# Patient Record
Sex: FEMALE | Race: WHITE | NOT HISPANIC OR LATINO
[De-identification: names, ages, dates, MRNs, and addresses within clinical notes are randomized per-mention and may not be internally consistent; named-entity substitution may affect disease eponyms.]

---

## 2017-01-23 ENCOUNTER — APPOINTMENT (OUTPATIENT)
Dept: FAMILY MEDICINE | Facility: CLINIC | Age: 67
End: 2017-01-23
Payer: MEDICARE

## 2017-01-23 ENCOUNTER — FORM ENCOUNTER (OUTPATIENT)
Age: 67
End: 2017-01-23

## 2017-01-23 VITALS
SYSTOLIC BLOOD PRESSURE: 150 MMHG | BODY MASS INDEX: 21.33 KG/M2 | WEIGHT: 144 LBS | DIASTOLIC BLOOD PRESSURE: 86 MMHG | HEIGHT: 69 IN

## 2017-01-23 PROCEDURE — 99214 OFFICE O/P EST MOD 30 MIN: CPT | Mod: 25

## 2017-01-23 PROCEDURE — 36415 COLL VENOUS BLD VENIPUNCTURE: CPT

## 2017-01-24 ENCOUNTER — OUTPATIENT (OUTPATIENT)
Dept: OUTPATIENT SERVICES | Facility: HOSPITAL | Age: 67
LOS: 1 days | End: 2017-01-24
Payer: MEDICARE

## 2017-01-24 ENCOUNTER — TRANSCRIPTION ENCOUNTER (OUTPATIENT)
Age: 67
End: 2017-01-24

## 2017-01-24 ENCOUNTER — APPOINTMENT (OUTPATIENT)
Dept: ULTRASOUND IMAGING | Facility: CLINIC | Age: 67
End: 2017-01-24
Payer: MEDICARE

## 2017-01-24 DIAGNOSIS — R59.0 LOCALIZED ENLARGED LYMPH NODES: ICD-10-CM

## 2017-01-24 LAB
BASOPHILS # BLD AUTO: 0.06 K/UL
BASOPHILS NFR BLD AUTO: 1.1 %
EOSINOPHIL # BLD AUTO: 0.29 K/UL
EOSINOPHIL NFR BLD AUTO: 5.5 %
HCT VFR BLD CALC: 42.4 %
HGB BLD-MCNC: 13.6 G/DL
IMM GRANULOCYTES NFR BLD AUTO: 0.2 %
LYMPHOCYTES # BLD AUTO: 1.45 K/UL
LYMPHOCYTES NFR BLD AUTO: 27.4 %
MAN DIFF?: NORMAL
MCHC RBC-ENTMCNC: 31.7 PG
MCHC RBC-ENTMCNC: 32.1 GM/DL
MCV RBC AUTO: 98.8 FL
MONOCYTES # BLD AUTO: 0.3 K/UL
MONOCYTES NFR BLD AUTO: 5.7 %
NEUTROPHILS # BLD AUTO: 3.18 K/UL
NEUTROPHILS NFR BLD AUTO: 60.1 %
PLATELET # BLD AUTO: 230 K/UL
RBC # BLD: 4.29 M/UL
RBC # FLD: 12.5 %
WBC # FLD AUTO: 5.29 K/UL

## 2017-01-24 PROCEDURE — 76705 ECHO EXAM OF ABDOMEN: CPT

## 2017-01-24 PROCEDURE — 76705 ECHO EXAM OF ABDOMEN: CPT | Mod: 26

## 2017-01-30 ENCOUNTER — RESULT REVIEW (OUTPATIENT)
Age: 67
End: 2017-01-30

## 2017-01-30 ENCOUNTER — FORM ENCOUNTER (OUTPATIENT)
Age: 67
End: 2017-01-30

## 2017-01-31 ENCOUNTER — OUTPATIENT (OUTPATIENT)
Dept: OUTPATIENT SERVICES | Facility: HOSPITAL | Age: 67
LOS: 1 days | End: 2017-01-31
Payer: MEDICARE

## 2017-01-31 ENCOUNTER — APPOINTMENT (OUTPATIENT)
Dept: ULTRASOUND IMAGING | Facility: CLINIC | Age: 67
End: 2017-01-31

## 2017-01-31 DIAGNOSIS — R59.0 LOCALIZED ENLARGED LYMPH NODES: ICD-10-CM

## 2017-01-31 PROCEDURE — 88173 CYTOPATH EVAL FNA REPORT: CPT

## 2017-01-31 PROCEDURE — 88305 TISSUE EXAM BY PATHOLOGIST: CPT

## 2017-01-31 PROCEDURE — 76942 ECHO GUIDE FOR BIOPSY: CPT

## 2017-01-31 PROCEDURE — 38505 NEEDLE BIOPSY LYMPH NODES: CPT

## 2017-02-01 LAB — NON-GYNECOLOGICAL CYTOLOGY STUDY: SIGNIFICANT CHANGE UP

## 2017-02-02 ENCOUNTER — APPOINTMENT (OUTPATIENT)
Dept: ULTRASOUND IMAGING | Facility: CLINIC | Age: 67
End: 2017-02-02

## 2017-02-03 ENCOUNTER — OTHER (OUTPATIENT)
Age: 67
End: 2017-02-03

## 2017-02-07 ENCOUNTER — RESULT REVIEW (OUTPATIENT)
Age: 67
End: 2017-02-07

## 2017-02-07 ENCOUNTER — APPOINTMENT (OUTPATIENT)
Dept: RADIATION ONCOLOGY | Facility: CLINIC | Age: 67
End: 2017-02-07

## 2017-02-07 VITALS
RESPIRATION RATE: 16 BRPM | HEART RATE: 74 BPM | OXYGEN SATURATION: 100 % | SYSTOLIC BLOOD PRESSURE: 160 MMHG | DIASTOLIC BLOOD PRESSURE: 78 MMHG

## 2017-02-08 ENCOUNTER — OUTPATIENT (OUTPATIENT)
Dept: OUTPATIENT SERVICES | Facility: HOSPITAL | Age: 67
LOS: 1 days | Discharge: HOME | End: 2017-02-08

## 2017-02-08 ENCOUNTER — APPOINTMENT (OUTPATIENT)
Dept: HEMATOLOGY ONCOLOGY | Facility: CLINIC | Age: 67
End: 2017-02-08

## 2017-02-08 VITALS
TEMPERATURE: 97.9 F | RESPIRATION RATE: 16 BRPM | BODY MASS INDEX: 22.12 KG/M2 | SYSTOLIC BLOOD PRESSURE: 175 MMHG | DIASTOLIC BLOOD PRESSURE: 73 MMHG | WEIGHT: 149.8 LBS | OXYGEN SATURATION: 98 % | HEART RATE: 78 BPM

## 2017-02-08 DIAGNOSIS — C80.1 MALIGNANT (PRIMARY) NEOPLASM, UNSPECIFIED: ICD-10-CM

## 2017-02-08 LAB
BASOPHILS # BLD AUTO: 0.1 K/UL — SIGNIFICANT CHANGE UP (ref 0–0.2)
BASOPHILS NFR BLD AUTO: 1.4 % — SIGNIFICANT CHANGE UP (ref 0–2)
EOSINOPHIL # BLD AUTO: 0.2 K/UL — SIGNIFICANT CHANGE UP (ref 0–0.5)
EOSINOPHIL NFR BLD AUTO: 4 % — SIGNIFICANT CHANGE UP (ref 0–6)
HCT VFR BLD CALC: 43.9 % — SIGNIFICANT CHANGE UP (ref 34.5–45)
HGB BLD-MCNC: 14 G/DL — SIGNIFICANT CHANGE UP (ref 11.5–15.5)
LYMPHOCYTES # BLD AUTO: 1.3 K/UL — SIGNIFICANT CHANGE UP (ref 1–3.3)
LYMPHOCYTES # BLD AUTO: 26.1 % — SIGNIFICANT CHANGE UP (ref 13–44)
MCHC RBC-ENTMCNC: 31 PG — SIGNIFICANT CHANGE UP (ref 27–34)
MCHC RBC-ENTMCNC: 31.9 GM/DL — LOW (ref 32–36)
MCV RBC AUTO: 97.1 FL — SIGNIFICANT CHANGE UP (ref 80–100)
MONOCYTES # BLD AUTO: 0.3 K/UL — SIGNIFICANT CHANGE UP (ref 0–0.9)
MONOCYTES NFR BLD AUTO: 6.7 % — SIGNIFICANT CHANGE UP (ref 2–14)
NEUTROPHILS # BLD AUTO: 3 K/UL — SIGNIFICANT CHANGE UP (ref 1.8–7.4)
NEUTROPHILS NFR BLD AUTO: 61.8 % — SIGNIFICANT CHANGE UP (ref 43–77)
PLATELET # BLD AUTO: 215 K/UL — SIGNIFICANT CHANGE UP (ref 150–400)
RBC # BLD: 4.52 M/UL — SIGNIFICANT CHANGE UP (ref 3.8–5.2)
RBC # FLD: 11.2 % — SIGNIFICANT CHANGE UP (ref 10.3–14.5)
WBC # BLD: 4.9 K/UL — SIGNIFICANT CHANGE UP (ref 3.8–10.5)
WBC # FLD AUTO: 4.9 K/UL — SIGNIFICANT CHANGE UP (ref 3.8–10.5)

## 2017-02-12 LAB
ALBUMIN SERPL ELPH-MCNC: 4.5 G/DL
ALP BLD-CCNC: 79 U/L
ALT SERPL-CCNC: 27 U/L
ANION GAP SERPL CALC-SCNC: 12 MMOL/L
AST SERPL-CCNC: 28 U/L
BILIRUB SERPL-MCNC: 0.3 MG/DL
BUN SERPL-MCNC: 20 MG/DL
CALCIUM SERPL-MCNC: 9.8 MG/DL
CANCER AG19-9 SERPL-ACNC: 49.7 U/ML
CEA SERPL-MCNC: 4.1 NG/ML
CHLORIDE SERPL-SCNC: 99 MMOL/L
CO2 SERPL-SCNC: 29 MMOL/L
CREAT SERPL-MCNC: 0.86 MG/DL
GLUCOSE SERPL-MCNC: 91 MG/DL
MAGNESIUM SERPL-MCNC: 2.4 MG/DL
POTASSIUM SERPL-SCNC: 4.8 MMOL/L
PROT SERPL-MCNC: 7.2 G/DL
SODIUM SERPL-SCNC: 140 MMOL/L

## 2017-02-14 ENCOUNTER — FORM ENCOUNTER (OUTPATIENT)
Age: 67
End: 2017-02-14

## 2017-02-15 ENCOUNTER — APPOINTMENT (OUTPATIENT)
Dept: CT IMAGING | Facility: CLINIC | Age: 67
End: 2017-02-15

## 2017-02-15 ENCOUNTER — OUTPATIENT (OUTPATIENT)
Dept: OUTPATIENT SERVICES | Facility: HOSPITAL | Age: 67
LOS: 1 days | End: 2017-02-15
Payer: MEDICARE

## 2017-02-15 ENCOUNTER — APPOINTMENT (OUTPATIENT)
Dept: FAMILY MEDICINE | Facility: CLINIC | Age: 67
End: 2017-02-15

## 2017-02-15 DIAGNOSIS — Z00.8 ENCOUNTER FOR OTHER GENERAL EXAMINATION: ICD-10-CM

## 2017-02-15 DIAGNOSIS — C80.1 MALIGNANT (PRIMARY) NEOPLASM, UNSPECIFIED: ICD-10-CM

## 2017-02-15 PROCEDURE — 82565 ASSAY OF CREATININE: CPT

## 2017-02-15 PROCEDURE — 71260 CT THORAX DX C+: CPT

## 2017-02-15 PROCEDURE — 74177 CT ABD & PELVIS W/CONTRAST: CPT

## 2017-02-15 PROCEDURE — 70491 CT SOFT TISSUE NECK W/DYE: CPT

## 2017-02-15 PROCEDURE — 74177 CT ABD & PELVIS W/CONTRAST: CPT | Mod: 26

## 2017-02-15 PROCEDURE — 70491 CT SOFT TISSUE NECK W/DYE: CPT | Mod: 26

## 2017-02-15 PROCEDURE — 71260 CT THORAX DX C+: CPT | Mod: 26

## 2017-02-22 ENCOUNTER — RESULT REVIEW (OUTPATIENT)
Age: 67
End: 2017-02-22

## 2017-02-23 ENCOUNTER — OUTPATIENT (OUTPATIENT)
Dept: OUTPATIENT SERVICES | Facility: HOSPITAL | Age: 67
LOS: 1 days | End: 2017-02-23
Payer: MEDICARE

## 2017-02-23 ENCOUNTER — EMERGENCY (EMERGENCY)
Facility: HOSPITAL | Age: 67
LOS: 1 days | Discharge: HOME | End: 2017-02-23
Attending: EMERGENCY MEDICINE
Payer: MEDICARE

## 2017-02-23 VITALS
HEART RATE: 79 BPM | TEMPERATURE: 98 F | WEIGHT: 143.96 LBS | RESPIRATION RATE: 16 BRPM | DIASTOLIC BLOOD PRESSURE: 72 MMHG | OXYGEN SATURATION: 98 % | HEIGHT: 64 IN | SYSTOLIC BLOOD PRESSURE: 125 MMHG

## 2017-02-23 VITALS
TEMPERATURE: 98 F | RESPIRATION RATE: 18 BRPM | SYSTOLIC BLOOD PRESSURE: 110 MMHG | HEART RATE: 74 BPM | OXYGEN SATURATION: 98 % | DIASTOLIC BLOOD PRESSURE: 78 MMHG

## 2017-02-23 DIAGNOSIS — E86.0 DEHYDRATION: ICD-10-CM

## 2017-02-23 DIAGNOSIS — R00.0 TACHYCARDIA, UNSPECIFIED: ICD-10-CM

## 2017-02-23 DIAGNOSIS — K62.5 HEMORRHAGE OF ANUS AND RECTUM: ICD-10-CM

## 2017-02-23 DIAGNOSIS — R00.2 PALPITATIONS: ICD-10-CM

## 2017-02-23 LAB
ALBUMIN SERPL ELPH-MCNC: 3.9 G/DL — SIGNIFICANT CHANGE UP (ref 3.3–5.2)
ALP SERPL-CCNC: 65 U/L — SIGNIFICANT CHANGE UP (ref 40–120)
ALT FLD-CCNC: 15 U/L — SIGNIFICANT CHANGE UP
ANION GAP SERPL CALC-SCNC: 12 MMOL/L — SIGNIFICANT CHANGE UP (ref 5–17)
APTT BLD: 31.7 SEC — SIGNIFICANT CHANGE UP (ref 27.5–37.4)
AST SERPL-CCNC: 18 U/L — SIGNIFICANT CHANGE UP
BASOPHILS # BLD AUTO: 0 K/UL — SIGNIFICANT CHANGE UP (ref 0–0.2)
BASOPHILS NFR BLD AUTO: 0.3 % — SIGNIFICANT CHANGE UP (ref 0–2)
BILIRUB SERPL-MCNC: 0.3 MG/DL — LOW (ref 0.4–2)
BUN SERPL-MCNC: 12 MG/DL — SIGNIFICANT CHANGE UP (ref 8–20)
CALCIUM SERPL-MCNC: 8.9 MG/DL — SIGNIFICANT CHANGE UP (ref 8.6–10.2)
CHLORIDE SERPL-SCNC: 104 MMOL/L — SIGNIFICANT CHANGE UP (ref 98–107)
CO2 SERPL-SCNC: 25 MMOL/L — SIGNIFICANT CHANGE UP (ref 22–29)
CREAT SERPL-MCNC: 0.73 MG/DL — SIGNIFICANT CHANGE UP (ref 0.5–1.3)
EOSINOPHIL # BLD AUTO: 0.1 K/UL — SIGNIFICANT CHANGE UP (ref 0–0.5)
EOSINOPHIL NFR BLD AUTO: 1.3 % — SIGNIFICANT CHANGE UP (ref 0–6)
GLUCOSE SERPL-MCNC: 104 MG/DL — SIGNIFICANT CHANGE UP (ref 70–115)
HCT VFR BLD CALC: 40.6 % — SIGNIFICANT CHANGE UP (ref 37–47)
HGB BLD-MCNC: 13.5 G/DL — SIGNIFICANT CHANGE UP (ref 12–16)
INR BLD: 1.01 RATIO — SIGNIFICANT CHANGE UP (ref 0.88–1.16)
LYMPHOCYTES # BLD AUTO: 0.9 K/UL — LOW (ref 1–4.8)
LYMPHOCYTES # BLD AUTO: 13.8 % — LOW (ref 20–55)
MCHC RBC-ENTMCNC: 32.4 PG — HIGH (ref 27–31)
MCHC RBC-ENTMCNC: 33.3 G/DL — SIGNIFICANT CHANGE UP (ref 32–36)
MCV RBC AUTO: 97.4 FL — SIGNIFICANT CHANGE UP (ref 81–99)
MONOCYTES # BLD AUTO: 0 K/UL — SIGNIFICANT CHANGE UP (ref 0–0.8)
MONOCYTES NFR BLD AUTO: 0.7 % — LOW (ref 3–10)
NEUTROPHILS # BLD AUTO: 5.6 K/UL — SIGNIFICANT CHANGE UP (ref 1.8–8)
NEUTROPHILS NFR BLD AUTO: 83.8 % — HIGH (ref 37–73)
NT-PROBNP SERPL-SCNC: 126 PG/ML — SIGNIFICANT CHANGE UP (ref 0–300)
PLATELET # BLD AUTO: 180 K/UL — SIGNIFICANT CHANGE UP (ref 150–400)
POTASSIUM SERPL-MCNC: 4.2 MMOL/L — SIGNIFICANT CHANGE UP (ref 3.5–5.3)
POTASSIUM SERPL-SCNC: 4.2 MMOL/L — SIGNIFICANT CHANGE UP (ref 3.5–5.3)
PROT SERPL-MCNC: 7 G/DL — SIGNIFICANT CHANGE UP (ref 6.6–8.7)
PROTHROM AB SERPL-ACNC: 11.1 SEC — SIGNIFICANT CHANGE UP (ref 10–13.1)
RBC # BLD: 4.17 M/UL — LOW (ref 4.4–5.2)
RBC # FLD: 12.6 % — SIGNIFICANT CHANGE UP (ref 11–15.6)
SODIUM SERPL-SCNC: 141 MMOL/L — SIGNIFICANT CHANGE UP (ref 135–145)
TROPONIN T SERPL-MCNC: <0.01 NG/ML — SIGNIFICANT CHANGE UP (ref 0–0.06)
WBC # BLD: 6.7 K/UL — SIGNIFICANT CHANGE UP (ref 4.8–10.8)
WBC # FLD AUTO: 6.7 K/UL — SIGNIFICANT CHANGE UP (ref 4.8–10.8)

## 2017-02-23 PROCEDURE — 83880 ASSAY OF NATRIURETIC PEPTIDE: CPT

## 2017-02-23 PROCEDURE — 99285 EMERGENCY DEPT VISIT HI MDM: CPT

## 2017-02-23 PROCEDURE — 85610 PROTHROMBIN TIME: CPT

## 2017-02-23 PROCEDURE — 85730 THROMBOPLASTIN TIME PARTIAL: CPT

## 2017-02-23 PROCEDURE — 88305 TISSUE EXAM BY PATHOLOGIST: CPT | Mod: 26

## 2017-02-23 PROCEDURE — 93010 ELECTROCARDIOGRAM REPORT: CPT

## 2017-02-23 PROCEDURE — 93005 ELECTROCARDIOGRAM TRACING: CPT

## 2017-02-23 PROCEDURE — 80053 COMPREHEN METABOLIC PANEL: CPT

## 2017-02-23 PROCEDURE — 84484 ASSAY OF TROPONIN QUANT: CPT

## 2017-02-23 PROCEDURE — 99284 EMERGENCY DEPT VISIT MOD MDM: CPT | Mod: 25

## 2017-02-23 PROCEDURE — 45331 SIGMOIDOSCOPY AND BIOPSY: CPT

## 2017-02-23 PROCEDURE — 85027 COMPLETE CBC AUTOMATED: CPT

## 2017-02-23 PROCEDURE — 88305 TISSUE EXAM BY PATHOLOGIST: CPT

## 2017-02-23 RX ORDER — SODIUM CHLORIDE 9 MG/ML
3 INJECTION INTRAMUSCULAR; INTRAVENOUS; SUBCUTANEOUS ONCE
Qty: 0 | Refills: 0 | Status: DISCONTINUED | OUTPATIENT
Start: 2017-02-23 | End: 2017-02-27

## 2017-02-23 RX ORDER — SODIUM CHLORIDE 9 MG/ML
1000 INJECTION INTRAMUSCULAR; INTRAVENOUS; SUBCUTANEOUS ONCE
Qty: 0 | Refills: 0 | Status: DISCONTINUED | OUTPATIENT
Start: 2017-02-23 | End: 2017-02-23

## 2017-02-23 NOTE — ED PROVIDER NOTE - PHYSICAL EXAMINATION
Constitutional: Alert, NAD.   ENT: Airway patent. Nose clear. Mouth with normal mucosa.   Head: Atraumatic.   Eyes: Clear bilateraly. PERRL.   Cardiac: Normal rate. regular rhythm with occasional PAC  Respiratory: Breath sounds clear bilaterally.   GI: Abdomen soft, non-tender, no guarding.   : No CVA or bladder tenderness.   Musculoskeletal: FROM, no muscle or joint tenderness or swelling.   Neuro: alert and oriented, no focal deficits, no motor or sensory deficits.   Skin: Dry, intact, no rash.   Psych: normal mood and affect.

## 2017-02-23 NOTE — ED ADULT TRIAGE NOTE - CHIEF COMPLAINT QUOTE
BIBA, patient is awake and oriented times 3, complains of no pain or injury, patient was sent from endo for eval of an elevated HR of 120 during her colonoscopy, patient denies any symptoms at this time

## 2017-02-23 NOTE — ED ADULT NURSE REASSESSMENT NOTE - NS ED NURSE REASSESS COMMENT FT1
usresh lunch tray well, cm 70's no ectopy, pt denies any compliants at this time, will continue to eval pt.

## 2017-02-23 NOTE — ED ADULT NURSE NOTE - OBJECTIVE STATEMENT
pt biba from out center where pt was having a colonoscopy + bx done, when pt developed palpitations hr 120's, denies chest pain or sob, currently resolved palpitations, denies any complaints.

## 2017-02-23 NOTE — ED PROVIDER NOTE - OBJECTIVE STATEMENT
CC: sent from GI s/p colonoscopy  Presenting symptoms: 67yo female sent in by GI s/p colonoscpy where during procedure she became tachycardic to 120s and post op remained tachy for a short time.  Patient had no symptoms with this, but it looked irregular on monitor   Pertinent Negatives: no palpitations, no CP, no SOB, no fever, no abd pain  Timing: today  Quality: none  Severity: no symptoms  Aggravating Factors: none  Relieving Factors: none

## 2017-02-23 NOTE — ED PROVIDER NOTE - MEDICAL DECISION MAKING DETAILS
Patient ate full meal here and drank plenty of fluids, will continue to rehydrate at home, refusing IVF.  Given detailed d/c and return instructions and verbally states understanding.  AQA

## 2017-02-23 NOTE — ED ADULT NURSE NOTE - NS ED NURSE DC INFO COMPLEXITY
Verbalized Understanding/Straightforward: Basic instructions, no meds, no home treatment/Patient asked questions

## 2017-02-24 LAB — SURGICAL PATHOLOGY FINAL REPORT - CH: SIGNIFICANT CHANGE UP

## 2017-02-26 ENCOUNTER — RESULT REVIEW (OUTPATIENT)
Age: 67
End: 2017-02-26

## 2017-02-27 ENCOUNTER — APPOINTMENT (OUTPATIENT)
Dept: HEMATOLOGY ONCOLOGY | Facility: CLINIC | Age: 67
End: 2017-02-27

## 2017-02-27 VITALS
RESPIRATION RATE: 18 BRPM | WEIGHT: 147.6 LBS | BODY MASS INDEX: 21.8 KG/M2 | OXYGEN SATURATION: 98 % | DIASTOLIC BLOOD PRESSURE: 72 MMHG | TEMPERATURE: 98.1 F | SYSTOLIC BLOOD PRESSURE: 162 MMHG | HEART RATE: 67 BPM

## 2017-02-27 LAB
BASOPHILS # BLD AUTO: 0 K/UL — SIGNIFICANT CHANGE UP (ref 0–0.2)
BASOPHILS NFR BLD AUTO: 1 % — SIGNIFICANT CHANGE UP (ref 0–2)
EOSINOPHIL # BLD AUTO: 0.2 K/UL — SIGNIFICANT CHANGE UP (ref 0–0.5)
EOSINOPHIL NFR BLD AUTO: 5 % — SIGNIFICANT CHANGE UP (ref 0–6)
HCT VFR BLD CALC: 40.7 % — SIGNIFICANT CHANGE UP (ref 34.5–45)
HGB BLD-MCNC: 13.9 G/DL — SIGNIFICANT CHANGE UP (ref 11.5–15.5)
LYMPHOCYTES # BLD AUTO: 1.4 K/UL — SIGNIFICANT CHANGE UP (ref 1–3.3)
LYMPHOCYTES # BLD AUTO: 29.6 % — SIGNIFICANT CHANGE UP (ref 13–44)
MCHC RBC-ENTMCNC: 32.8 PG — SIGNIFICANT CHANGE UP (ref 27–34)
MCHC RBC-ENTMCNC: 34.2 GM/DL — SIGNIFICANT CHANGE UP (ref 32–36)
MCV RBC AUTO: 95.9 FL — SIGNIFICANT CHANGE UP (ref 80–100)
MONOCYTES # BLD AUTO: 0.3 K/UL — SIGNIFICANT CHANGE UP (ref 0–0.9)
MONOCYTES NFR BLD AUTO: 6.3 % — SIGNIFICANT CHANGE UP (ref 2–14)
NEUTROPHILS # BLD AUTO: 2.7 K/UL — SIGNIFICANT CHANGE UP (ref 1.8–7.4)
NEUTROPHILS NFR BLD AUTO: 58.1 % — SIGNIFICANT CHANGE UP (ref 43–77)
PATH REPORT ADDENDUM.SYNOPTIC DOC: SIGNIFICANT CHANGE UP
PLATELET # BLD AUTO: 220 K/UL — SIGNIFICANT CHANGE UP (ref 150–400)
RBC # BLD: 4.24 M/UL — SIGNIFICANT CHANGE UP (ref 3.8–5.2)
RBC # FLD: 11.1 % — SIGNIFICANT CHANGE UP (ref 10.3–14.5)
WBC # BLD: 4.6 K/UL — SIGNIFICANT CHANGE UP (ref 3.8–10.5)
WBC # FLD AUTO: 4.6 K/UL — SIGNIFICANT CHANGE UP (ref 3.8–10.5)

## 2017-02-28 ENCOUNTER — MEDICATION RENEWAL (OUTPATIENT)
Age: 67
End: 2017-02-28

## 2017-02-28 LAB
ALBUMIN SERPL ELPH-MCNC: 4.3 G/DL
ALP BLD-CCNC: 68 U/L
ALT SERPL-CCNC: 14 U/L
ANION GAP SERPL CALC-SCNC: 14 MMOL/L
AST SERPL-CCNC: 21 U/L
BILIRUB SERPL-MCNC: 0.2 MG/DL
BUN SERPL-MCNC: 20 MG/DL
CALCIUM SERPL-MCNC: 9.6 MG/DL
CHLORIDE SERPL-SCNC: 101 MMOL/L
CO2 SERPL-SCNC: 24 MMOL/L
CREAT SERPL-MCNC: 0.9 MG/DL
GLUCOSE SERPL-MCNC: 102 MG/DL
MAGNESIUM SERPL-MCNC: 2.2 MG/DL
POTASSIUM SERPL-SCNC: 4.6 MMOL/L
PROT SERPL-MCNC: 7 G/DL
SODIUM SERPL-SCNC: 139 MMOL/L

## 2017-03-01 ENCOUNTER — FORM ENCOUNTER (OUTPATIENT)
Age: 67
End: 2017-03-01

## 2017-03-02 ENCOUNTER — OUTPATIENT (OUTPATIENT)
Dept: OUTPATIENT SERVICES | Facility: HOSPITAL | Age: 67
LOS: 1 days | End: 2017-03-02

## 2017-03-02 ENCOUNTER — APPOINTMENT (OUTPATIENT)
Dept: NUCLEAR MEDICINE | Facility: CLINIC | Age: 67
End: 2017-03-02

## 2017-03-02 DIAGNOSIS — R59.0 LOCALIZED ENLARGED LYMPH NODES: ICD-10-CM

## 2017-03-06 ENCOUNTER — APPOINTMENT (OUTPATIENT)
Dept: COLORECTAL SURGERY | Facility: CLINIC | Age: 67
End: 2017-03-06

## 2017-03-06 ENCOUNTER — OUTPATIENT (OUTPATIENT)
Dept: OUTPATIENT SERVICES | Facility: HOSPITAL | Age: 67
LOS: 1 days | Discharge: HOME | End: 2017-03-06
Payer: MEDICARE

## 2017-03-06 ENCOUNTER — APPOINTMENT (OUTPATIENT)
Dept: FAMILY MEDICINE | Facility: CLINIC | Age: 67
End: 2017-03-06

## 2017-03-06 ENCOUNTER — APPOINTMENT (OUTPATIENT)
Dept: CARDIOLOGY | Facility: CLINIC | Age: 67
End: 2017-03-06

## 2017-03-06 VITALS
DIASTOLIC BLOOD PRESSURE: 64 MMHG | HEART RATE: 83 BPM | OXYGEN SATURATION: 99 % | WEIGHT: 145.06 LBS | HEIGHT: 68 IN | SYSTOLIC BLOOD PRESSURE: 156 MMHG | TEMPERATURE: 98 F | RESPIRATION RATE: 14 BRPM

## 2017-03-06 VITALS — OXYGEN SATURATION: 100 % | HEART RATE: 75 BPM | DIASTOLIC BLOOD PRESSURE: 65 MMHG | SYSTOLIC BLOOD PRESSURE: 157 MMHG

## 2017-03-06 VITALS
BODY MASS INDEX: 21.77 KG/M2 | RESPIRATION RATE: 18 BRPM | HEART RATE: 77 BPM | HEIGHT: 69 IN | TEMPERATURE: 97.8 F | SYSTOLIC BLOOD PRESSURE: 177 MMHG | DIASTOLIC BLOOD PRESSURE: 91 MMHG | WEIGHT: 147 LBS | OXYGEN SATURATION: 99 %

## 2017-03-06 VITALS
TEMPERATURE: 97.8 F | BODY MASS INDEX: 21.33 KG/M2 | RESPIRATION RATE: 18 BRPM | HEIGHT: 69 IN | DIASTOLIC BLOOD PRESSURE: 65 MMHG | SYSTOLIC BLOOD PRESSURE: 157 MMHG | OXYGEN SATURATION: 100 % | HEART RATE: 75 BPM | WEIGHT: 144 LBS

## 2017-03-06 DIAGNOSIS — I10 ESSENTIAL (PRIMARY) HYPERTENSION: ICD-10-CM

## 2017-03-06 DIAGNOSIS — Z90.710 ACQUIRED ABSENCE OF BOTH CERVIX AND UTERUS: Chronic | ICD-10-CM

## 2017-03-06 DIAGNOSIS — Z90.89 ACQUIRED ABSENCE OF OTHER ORGANS: Chronic | ICD-10-CM

## 2017-03-06 DIAGNOSIS — Z98.49 CATARACT EXTRACTION STATUS, UNSPECIFIED EYE: Chronic | ICD-10-CM

## 2017-03-06 DIAGNOSIS — I65.29 OCCLUSION AND STENOSIS OF UNSPECIFIED CAROTID ARTERY: ICD-10-CM

## 2017-03-06 DIAGNOSIS — C21.1 MALIGNANT NEOPLASM OF ANAL CANAL: ICD-10-CM

## 2017-03-06 DIAGNOSIS — E78.5 HYPERLIPIDEMIA, UNSPECIFIED: ICD-10-CM

## 2017-03-06 DIAGNOSIS — Z98.890 OTHER SPECIFIED POSTPROCEDURAL STATES: Chronic | ICD-10-CM

## 2017-03-06 DIAGNOSIS — Z01.818 ENCOUNTER FOR OTHER PREPROCEDURAL EXAMINATION: ICD-10-CM

## 2017-03-06 DIAGNOSIS — C80.1 MALIGNANT (PRIMARY) NEOPLASM, UNSPECIFIED: ICD-10-CM

## 2017-03-06 LAB
ANION GAP SERPL CALC-SCNC: 9 MMOL/L — SIGNIFICANT CHANGE UP (ref 5–17)
APTT BLD: 29.6 SEC — SIGNIFICANT CHANGE UP (ref 27.5–37.4)
BASOPHILS # BLD AUTO: 0.1 K/UL — SIGNIFICANT CHANGE UP (ref 0–0.2)
BASOPHILS NFR BLD AUTO: 1 % — SIGNIFICANT CHANGE UP (ref 0–2)
BUN SERPL-MCNC: 12 MG/DL — SIGNIFICANT CHANGE UP (ref 7–23)
CALCIUM SERPL-MCNC: 9.3 MG/DL — SIGNIFICANT CHANGE UP (ref 8.5–10.1)
CHLORIDE SERPL-SCNC: 103 MMOL/L — SIGNIFICANT CHANGE UP (ref 96–108)
CO2 SERPL-SCNC: 28 MMOL/L — SIGNIFICANT CHANGE UP (ref 22–31)
CREAT SERPL-MCNC: 0.8 MG/DL — SIGNIFICANT CHANGE UP (ref 0.5–1.3)
EOSINOPHIL # BLD AUTO: 0.1 K/UL — SIGNIFICANT CHANGE UP (ref 0–0.5)
EOSINOPHIL NFR BLD AUTO: 2 % — SIGNIFICANT CHANGE UP (ref 0–6)
GLUCOSE SERPL-MCNC: 101 MG/DL — HIGH (ref 70–99)
HCT VFR BLD CALC: 42 % — SIGNIFICANT CHANGE UP (ref 34.5–45)
HGB BLD-MCNC: 14 G/DL — SIGNIFICANT CHANGE UP (ref 11.5–15.5)
INR BLD: 1 RATIO — SIGNIFICANT CHANGE UP (ref 0.88–1.16)
LYMPHOCYTES # BLD AUTO: 1 K/UL — SIGNIFICANT CHANGE UP (ref 1–3.3)
LYMPHOCYTES # BLD AUTO: 20.5 % — SIGNIFICANT CHANGE UP (ref 13–44)
MCHC RBC-ENTMCNC: 32.4 PG — SIGNIFICANT CHANGE UP (ref 27–34)
MCHC RBC-ENTMCNC: 33.2 GM/DL — SIGNIFICANT CHANGE UP (ref 32–36)
MCV RBC AUTO: 97.6 FL — SIGNIFICANT CHANGE UP (ref 80–100)
MONOCYTES # BLD AUTO: 0.2 K/UL — SIGNIFICANT CHANGE UP (ref 0–0.9)
MONOCYTES NFR BLD AUTO: 4.1 % — SIGNIFICANT CHANGE UP (ref 2–14)
NEUTROPHILS # BLD AUTO: 3.6 K/UL — SIGNIFICANT CHANGE UP (ref 1.8–7.4)
NEUTROPHILS NFR BLD AUTO: 72.4 % — SIGNIFICANT CHANGE UP (ref 43–77)
PLATELET # BLD AUTO: 230 K/UL — SIGNIFICANT CHANGE UP (ref 150–400)
POTASSIUM SERPL-MCNC: 4.6 MMOL/L — SIGNIFICANT CHANGE UP (ref 3.5–5.3)
POTASSIUM SERPL-SCNC: 4.6 MMOL/L — SIGNIFICANT CHANGE UP (ref 3.5–5.3)
PROTHROM AB SERPL-ACNC: 11 SEC — SIGNIFICANT CHANGE UP (ref 10–13.1)
RBC # BLD: 4.3 M/UL — SIGNIFICANT CHANGE UP (ref 3.8–5.2)
RBC # FLD: 11.8 % — SIGNIFICANT CHANGE UP (ref 10.3–14.5)
SODIUM SERPL-SCNC: 140 MMOL/L — SIGNIFICANT CHANGE UP (ref 135–145)
WBC # BLD: 5 K/UL — SIGNIFICANT CHANGE UP (ref 3.8–10.5)
WBC # FLD AUTO: 5 K/UL — SIGNIFICANT CHANGE UP (ref 3.8–10.5)

## 2017-03-06 PROCEDURE — 93010 ELECTROCARDIOGRAM REPORT: CPT

## 2017-03-06 PROCEDURE — 93306 TTE W/DOPPLER COMPLETE: CPT | Mod: 26

## 2017-03-06 NOTE — H&P PST ADULT - PSH
History of back surgery  s/p discectomy 1973  S/P cataract surgery  B/L 2015  S/P colonoscopy  Baseline 2006  S/P DANDRE (total abdominal hysterectomy)  1996  S/P tonsillectomy and adenoidectomy  childhood

## 2017-03-06 NOTE — H&P PST ADULT - FAMILY HISTORY
Father  Still living? No  Family history of primary liver cancer, Age at diagnosis: 71-80  Family history of pancreatic cancer, Age at diagnosis: 71-80     Mother  Still living? No  Family history of Alzheimer's disease, Age at diagnosis: 71-80  Family history of cerebrovascular accident (CVA) in mother, Age at diagnosis: 71-80

## 2017-03-06 NOTE — H&P PST ADULT - HISTORY OF PRESENT ILLNESS
This is a  66y /o Female who presents to [facility] prior to proposed procedure with Dr. Hollingsworth for rectal exam under anesthesia, biopsy of anal mass, possible excisional biopsy. Reports swollen lymph node of right inguinal region 1/12/2017 and seen by her PCP Dr. Huang with abdominopelvic sonogram, fine needle biopsy of lymph node with findings of squamous cells. Reports seen by her GI Dr. Morales 2/23/2017 for attempt of colonoscopy with biopsy of anus [which was not fully completed]and referred to Dr. Hollingsworth. Denies any nausea, vomiting, diarrhea or constipation. Bowel movements are reported daily. Evalauted by Dr. Hollingsworth and now presents for said procedure.

## 2017-03-06 NOTE — H&P PST ADULT - ASSESSMENT
This is a  66y /o Female who presents to [facility] prior to proposed procedure with Dr. Hollingsworth for rectal exam under anesthesia, biopsy of anal mass, possible excisional biopsy.    1. Labs: per Dr. Hollingsworth.  2. EKG to be reviewed by Cardiology  3. Clearance with Dr. Huang and Dr. East per patient.  4. Day of procedure instructions and holistic sheet provided and reviewed with patient.

## 2017-03-07 ENCOUNTER — MEDICATION RENEWAL (OUTPATIENT)
Age: 67
End: 2017-03-07

## 2017-03-07 ENCOUNTER — RESULT REVIEW (OUTPATIENT)
Age: 67
End: 2017-03-07

## 2017-03-07 ENCOUNTER — OUTPATIENT (OUTPATIENT)
Dept: OUTPATIENT SERVICES | Facility: HOSPITAL | Age: 67
LOS: 1 days | Discharge: HOME | End: 2017-03-07
Payer: MEDICARE

## 2017-03-07 ENCOUNTER — APPOINTMENT (OUTPATIENT)
Dept: COLORECTAL SURGERY | Facility: HOSPITAL | Age: 67
End: 2017-03-07

## 2017-03-07 VITALS
DIASTOLIC BLOOD PRESSURE: 56 MMHG | RESPIRATION RATE: 15 BRPM | SYSTOLIC BLOOD PRESSURE: 126 MMHG | HEART RATE: 63 BPM | TEMPERATURE: 98 F | OXYGEN SATURATION: 100 %

## 2017-03-07 VITALS
HEIGHT: 68 IN | WEIGHT: 145.06 LBS | OXYGEN SATURATION: 99 % | RESPIRATION RATE: 16 BRPM | SYSTOLIC BLOOD PRESSURE: 115 MMHG | HEART RATE: 64 BPM | TEMPERATURE: 98 F | DIASTOLIC BLOOD PRESSURE: 53 MMHG

## 2017-03-07 DIAGNOSIS — Z98.49 CATARACT EXTRACTION STATUS, UNSPECIFIED EYE: Chronic | ICD-10-CM

## 2017-03-07 DIAGNOSIS — Z90.89 ACQUIRED ABSENCE OF OTHER ORGANS: Chronic | ICD-10-CM

## 2017-03-07 DIAGNOSIS — Z90.710 ACQUIRED ABSENCE OF BOTH CERVIX AND UTERUS: Chronic | ICD-10-CM

## 2017-03-07 DIAGNOSIS — C21.1 MALIGNANT NEOPLASM OF ANAL CANAL: ICD-10-CM

## 2017-03-07 DIAGNOSIS — E78.5 HYPERLIPIDEMIA, UNSPECIFIED: ICD-10-CM

## 2017-03-07 DIAGNOSIS — K62.9 DISEASE OF ANUS AND RECTUM, UNSPECIFIED: ICD-10-CM

## 2017-03-07 DIAGNOSIS — I65.29 OCCLUSION AND STENOSIS OF UNSPECIFIED CAROTID ARTERY: ICD-10-CM

## 2017-03-07 DIAGNOSIS — I10 ESSENTIAL (PRIMARY) HYPERTENSION: ICD-10-CM

## 2017-03-07 DIAGNOSIS — Z98.890 OTHER SPECIFIED POSTPROCEDURAL STATES: Chronic | ICD-10-CM

## 2017-03-07 PROCEDURE — 45305 PROCTOSIGMOIDOSCOPY W/BX: CPT

## 2017-03-07 RX ORDER — FENTANYL CITRATE 50 UG/ML
50 INJECTION INTRAVENOUS
Qty: 0 | Refills: 0 | Status: DISCONTINUED | OUTPATIENT
Start: 2017-03-07 | End: 2017-03-13

## 2017-03-07 RX ORDER — OXYCODONE HYDROCHLORIDE 5 MG/1
1 TABLET ORAL
Qty: 0 | Refills: 0 | COMMUNITY
Start: 2017-03-07

## 2017-03-07 RX ORDER — ONDANSETRON 8 MG/1
4 TABLET, FILM COATED ORAL ONCE
Qty: 0 | Refills: 0 | Status: DISCONTINUED | OUTPATIENT
Start: 2017-03-07 | End: 2017-03-13

## 2017-03-07 RX ORDER — OXYCODONE HYDROCHLORIDE 5 MG/1
1 TABLET ORAL
Qty: 20 | Refills: 0 | OUTPATIENT
Start: 2017-03-07

## 2017-03-07 RX ORDER — SODIUM CHLORIDE 9 MG/ML
1000 INJECTION, SOLUTION INTRAVENOUS
Qty: 0 | Refills: 0 | Status: DISCONTINUED | OUTPATIENT
Start: 2017-03-07 | End: 2017-03-13

## 2017-03-07 RX ADMIN — SODIUM CHLORIDE 100 MILLILITER(S): 9 INJECTION, SOLUTION INTRAVENOUS at 18:03

## 2017-03-07 RX ADMIN — FENTANYL CITRATE 50 MICROGRAM(S): 50 INJECTION INTRAVENOUS at 18:02

## 2017-03-07 NOTE — ASU DISCHARGE PLAN (ADULT/PEDIATRIC). - DRESSING FT
Remove dressing in am, pass packing like a bowel movement, sitz [id number]  place dry gauze on top.

## 2017-03-07 NOTE — ASU DISCHARGE PLAN (ADULT/PEDIATRIC). - NURSING INSTRUCTIONS
Colace 1 pill twice a day if stools are loose then decrease to one pill per day, if stools are hard increase to pills per day.  Fiber supplements twice a day.  Milk of MAgnesieum two tablespoons twice a day if no bowel movement for 24 hours. Colace 1 pill twice a day if stools are loose then decrease to one pill per day, if stools are hard increase to pills per day.  Fiber supplements twice a day.  Milk of MAgnesieum two tablespoons twice a day if no bowel movement for 24 hours.  Begin with liquids and light food ( tea, toast, Jello, soups). Advance to what you normally eat. Liquids should taken in adequate amounts today.    CALL the DOCTOR:  Fever above 101F                                      Signs  of infection such as : increase pain,swelling,redness,or a bad  smell coming from                                        the wound.                                       Any pain that appears to be getting worse.                                       Vomiting                                        If you have  not urinated 8 hours after surgery or have any difficulty urinating.     A responsible adult should be with you for the rest of the day and night for your safety and to help you if you needed.    Review attached FACT SHEET if applicable.

## 2017-03-07 NOTE — BRIEF OPERATIVE NOTE - PRE-OP DX
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

## 2017-03-07 NOTE — ASU DISCHARGE PLAN (ADULT/PEDIATRIC). - ACTIVITY LEVEL
exercise/Until off pain medication and in minimal pain  in about three to five days/nothing per rectum/nothing per vagina

## 2017-03-07 NOTE — BRIEF OPERATIVE NOTE - POST-OP DX
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

## 2017-03-08 PROCEDURE — 88305 TISSUE EXAM BY PATHOLOGIST: CPT | Mod: 26

## 2017-03-10 ENCOUNTER — APPOINTMENT (OUTPATIENT)
Dept: MRI IMAGING | Facility: CLINIC | Age: 67
End: 2017-03-10

## 2017-03-10 LAB — SURGICAL PATHOLOGY FINAL REPORT - CH: SIGNIFICANT CHANGE UP

## 2017-03-12 ENCOUNTER — FORM ENCOUNTER (OUTPATIENT)
Age: 67
End: 2017-03-12

## 2017-03-13 ENCOUNTER — APPOINTMENT (OUTPATIENT)
Dept: MRI IMAGING | Facility: CLINIC | Age: 67
End: 2017-03-13

## 2017-03-13 ENCOUNTER — OUTPATIENT (OUTPATIENT)
Dept: OUTPATIENT SERVICES | Facility: HOSPITAL | Age: 67
LOS: 1 days | Discharge: HOME | End: 2017-03-13

## 2017-03-13 ENCOUNTER — OUTPATIENT (OUTPATIENT)
Dept: OUTPATIENT SERVICES | Facility: HOSPITAL | Age: 67
LOS: 1 days | End: 2017-03-13
Payer: MEDICARE

## 2017-03-13 DIAGNOSIS — Z90.710 ACQUIRED ABSENCE OF BOTH CERVIX AND UTERUS: Chronic | ICD-10-CM

## 2017-03-13 DIAGNOSIS — C80.1 MALIGNANT (PRIMARY) NEOPLASM, UNSPECIFIED: ICD-10-CM

## 2017-03-13 DIAGNOSIS — Z98.49 CATARACT EXTRACTION STATUS, UNSPECIFIED EYE: Chronic | ICD-10-CM

## 2017-03-13 DIAGNOSIS — Z90.89 ACQUIRED ABSENCE OF OTHER ORGANS: Chronic | ICD-10-CM

## 2017-03-13 DIAGNOSIS — Z98.890 OTHER SPECIFIED POSTPROCEDURAL STATES: Chronic | ICD-10-CM

## 2017-03-13 PROCEDURE — 72197 MRI PELVIS W/O & W/DYE: CPT

## 2017-03-13 PROCEDURE — 74183 MRI ABD W/O CNTR FLWD CNTR: CPT

## 2017-03-13 PROCEDURE — A9585: CPT

## 2017-03-14 ENCOUNTER — RESULT REVIEW (OUTPATIENT)
Age: 67
End: 2017-03-14

## 2017-03-15 ENCOUNTER — APPOINTMENT (OUTPATIENT)
Dept: HEMATOLOGY ONCOLOGY | Facility: CLINIC | Age: 67
End: 2017-03-15

## 2017-03-15 ENCOUNTER — OTHER (OUTPATIENT)
Age: 67
End: 2017-03-15

## 2017-03-15 VITALS
OXYGEN SATURATION: 98 % | RESPIRATION RATE: 18 BRPM | BODY MASS INDEX: 21.45 KG/M2 | SYSTOLIC BLOOD PRESSURE: 150 MMHG | TEMPERATURE: 97.9 F | HEART RATE: 69 BPM | DIASTOLIC BLOOD PRESSURE: 74 MMHG | WEIGHT: 145.28 LBS

## 2017-03-15 LAB
BASOPHILS # BLD AUTO: 0.1 K/UL — SIGNIFICANT CHANGE UP (ref 0–0.2)
BASOPHILS NFR BLD AUTO: 1.5 % — SIGNIFICANT CHANGE UP (ref 0–2)
EOSINOPHIL # BLD AUTO: 0.2 K/UL — SIGNIFICANT CHANGE UP (ref 0–0.5)
EOSINOPHIL NFR BLD AUTO: 4.5 % — SIGNIFICANT CHANGE UP (ref 0–6)
HCT VFR BLD CALC: 39.4 % — SIGNIFICANT CHANGE UP (ref 34.5–45)
HGB BLD-MCNC: 13.4 G/DL — SIGNIFICANT CHANGE UP (ref 11.5–15.5)
LYMPHOCYTES # BLD AUTO: 1.2 K/UL — SIGNIFICANT CHANGE UP (ref 1–3.3)
LYMPHOCYTES # BLD AUTO: 25.1 % — SIGNIFICANT CHANGE UP (ref 13–44)
MCHC RBC-ENTMCNC: 33.9 GM/DL — SIGNIFICANT CHANGE UP (ref 32–36)
MCHC RBC-ENTMCNC: 34 PG — SIGNIFICANT CHANGE UP (ref 27–34)
MCV RBC AUTO: 100.3 FL — HIGH (ref 80–100)
MONOCYTES # BLD AUTO: 0.4 K/UL — SIGNIFICANT CHANGE UP (ref 0–0.9)
MONOCYTES NFR BLD AUTO: 7.5 % — SIGNIFICANT CHANGE UP (ref 2–14)
NEUTROPHILS # BLD AUTO: 3 K/UL — SIGNIFICANT CHANGE UP (ref 1.8–7.4)
NEUTROPHILS NFR BLD AUTO: 61.4 % — SIGNIFICANT CHANGE UP (ref 43–77)
PLATELET # BLD AUTO: 214 K/UL — SIGNIFICANT CHANGE UP (ref 150–400)
RBC # BLD: 3.93 M/UL — SIGNIFICANT CHANGE UP (ref 3.8–5.2)
RBC # FLD: 11.7 % — SIGNIFICANT CHANGE UP (ref 10.3–14.5)
WBC # BLD: 4.8 K/UL — SIGNIFICANT CHANGE UP (ref 3.8–10.5)
WBC # FLD AUTO: 4.8 K/UL — SIGNIFICANT CHANGE UP (ref 3.8–10.5)

## 2017-03-15 RX ORDER — DIAZEPAM 5 MG/1
5 TABLET ORAL
Qty: 1 | Refills: 0 | Status: DISCONTINUED | COMMUNITY
Start: 2017-03-07 | End: 2017-03-15

## 2017-03-16 LAB
ALBUMIN SERPL ELPH-MCNC: 4.1 G/DL
ALP BLD-CCNC: 59 U/L
ALT SERPL-CCNC: 14 U/L
ANION GAP SERPL CALC-SCNC: 10 MMOL/L
AST SERPL-CCNC: 19 U/L
BILIRUB SERPL-MCNC: 0.2 MG/DL
BUN SERPL-MCNC: 19 MG/DL
CALCIUM SERPL-MCNC: 9.4 MG/DL
CHLORIDE SERPL-SCNC: 102 MMOL/L
CO2 SERPL-SCNC: 28 MMOL/L
CREAT SERPL-MCNC: 0.88 MG/DL
GLUCOSE SERPL-MCNC: 95 MG/DL
MAGNESIUM SERPL-MCNC: 2.2 MG/DL
POTASSIUM SERPL-SCNC: 4.3 MMOL/L
PROT SERPL-MCNC: 6.8 G/DL
SODIUM SERPL-SCNC: 140 MMOL/L

## 2017-03-29 ENCOUNTER — APPOINTMENT (OUTPATIENT)
Dept: HEMATOLOGY ONCOLOGY | Facility: CLINIC | Age: 67
End: 2017-03-29

## 2017-04-02 ENCOUNTER — RESULT REVIEW (OUTPATIENT)
Age: 67
End: 2017-04-02

## 2017-04-03 ENCOUNTER — APPOINTMENT (OUTPATIENT)
Dept: HEMATOLOGY ONCOLOGY | Facility: CLINIC | Age: 67
End: 2017-04-03

## 2017-04-03 VITALS
RESPIRATION RATE: 12 BRPM | BODY MASS INDEX: 21.41 KG/M2 | OXYGEN SATURATION: 100 % | HEART RATE: 61 BPM | SYSTOLIC BLOOD PRESSURE: 138 MMHG | DIASTOLIC BLOOD PRESSURE: 70 MMHG | TEMPERATURE: 98.1 F | WEIGHT: 144.95 LBS

## 2017-04-03 LAB
BASOPHILS # BLD AUTO: 0.1 K/UL — SIGNIFICANT CHANGE UP (ref 0–0.2)
BASOPHILS NFR BLD AUTO: 1.1 % — SIGNIFICANT CHANGE UP (ref 0–2)
EOSINOPHIL # BLD AUTO: 0.2 K/UL — SIGNIFICANT CHANGE UP (ref 0–0.5)
EOSINOPHIL NFR BLD AUTO: 4.5 % — SIGNIFICANT CHANGE UP (ref 0–6)
HCT VFR BLD CALC: 39.4 % — SIGNIFICANT CHANGE UP (ref 34.5–45)
HGB BLD-MCNC: 13 G/DL — SIGNIFICANT CHANGE UP (ref 11.5–15.5)
LYMPHOCYTES # BLD AUTO: 1.5 K/UL — SIGNIFICANT CHANGE UP (ref 1–3.3)
LYMPHOCYTES # BLD AUTO: 31.4 % — SIGNIFICANT CHANGE UP (ref 13–44)
MCHC RBC-ENTMCNC: 32 PG — SIGNIFICANT CHANGE UP (ref 27–34)
MCHC RBC-ENTMCNC: 33 GM/DL — SIGNIFICANT CHANGE UP (ref 32–36)
MCV RBC AUTO: 97.1 FL — SIGNIFICANT CHANGE UP (ref 80–100)
MONOCYTES # BLD AUTO: 0.3 K/UL — SIGNIFICANT CHANGE UP (ref 0–0.9)
MONOCYTES NFR BLD AUTO: 7.1 % — SIGNIFICANT CHANGE UP (ref 2–14)
NEUTROPHILS # BLD AUTO: 2.6 K/UL — SIGNIFICANT CHANGE UP (ref 1.8–7.4)
NEUTROPHILS NFR BLD AUTO: 55.8 % — SIGNIFICANT CHANGE UP (ref 43–77)
PLATELET # BLD AUTO: 201 K/UL — SIGNIFICANT CHANGE UP (ref 150–400)
RBC # BLD: 4.06 M/UL — SIGNIFICANT CHANGE UP (ref 3.8–5.2)
RBC # FLD: 11.3 % — SIGNIFICANT CHANGE UP (ref 10.3–14.5)
WBC # BLD: 4.6 K/UL — SIGNIFICANT CHANGE UP (ref 3.8–10.5)
WBC # FLD AUTO: 4.6 K/UL — SIGNIFICANT CHANGE UP (ref 3.8–10.5)

## 2017-04-04 ENCOUNTER — OUTPATIENT (OUTPATIENT)
Dept: OUTPATIENT SERVICES | Facility: HOSPITAL | Age: 67
LOS: 1 days | Discharge: HOME | End: 2017-04-04
Payer: MEDICARE

## 2017-04-04 DIAGNOSIS — Z98.49 CATARACT EXTRACTION STATUS, UNSPECIFIED EYE: Chronic | ICD-10-CM

## 2017-04-04 DIAGNOSIS — Z90.89 ACQUIRED ABSENCE OF OTHER ORGANS: Chronic | ICD-10-CM

## 2017-04-04 DIAGNOSIS — Z90.710 ACQUIRED ABSENCE OF BOTH CERVIX AND UTERUS: Chronic | ICD-10-CM

## 2017-04-04 DIAGNOSIS — Z98.890 OTHER SPECIFIED POSTPROCEDURAL STATES: Chronic | ICD-10-CM

## 2017-04-04 LAB
ALBUMIN SERPL ELPH-MCNC: 4.1 G/DL
ALP BLD-CCNC: 57 U/L
ALT SERPL-CCNC: 13 U/L
ANION GAP SERPL CALC-SCNC: 11 MMOL/L
AST SERPL-CCNC: 17 U/L
BILIRUB SERPL-MCNC: 0.2 MG/DL
BUN SERPL-MCNC: 21 MG/DL
CALCIUM SERPL-MCNC: 9.3 MG/DL
CHLORIDE SERPL-SCNC: 103 MMOL/L
CO2 SERPL-SCNC: 26 MMOL/L
CREAT SERPL-MCNC: 0.81 MG/DL
GLUCOSE SERPL-MCNC: 93 MG/DL
MAGNESIUM SERPL-MCNC: 2.3 MG/DL
POTASSIUM SERPL-SCNC: 4.4 MMOL/L
PROT SERPL-MCNC: 6.7 G/DL
SODIUM SERPL-SCNC: 140 MMOL/L

## 2017-04-04 PROCEDURE — 77263 THER RADIOLOGY TX PLNG CPLX: CPT

## 2017-04-06 ENCOUNTER — APPOINTMENT (OUTPATIENT)
Dept: RADIATION ONCOLOGY | Facility: CLINIC | Age: 67
End: 2017-04-06

## 2017-04-10 ENCOUNTER — OUTPATIENT (OUTPATIENT)
Dept: OUTPATIENT SERVICES | Facility: HOSPITAL | Age: 67
LOS: 1 days | Discharge: HOME | End: 2017-04-10

## 2017-04-10 ENCOUNTER — APPOINTMENT (OUTPATIENT)
Dept: FAMILY MEDICINE | Facility: CLINIC | Age: 67
End: 2017-04-10

## 2017-04-10 VITALS
OXYGEN SATURATION: 99 % | SYSTOLIC BLOOD PRESSURE: 150 MMHG | HEIGHT: 69 IN | WEIGHT: 146 LBS | HEART RATE: 51 BPM | DIASTOLIC BLOOD PRESSURE: 80 MMHG | BODY MASS INDEX: 21.62 KG/M2

## 2017-04-10 VITALS — HEART RATE: 64 BPM | SYSTOLIC BLOOD PRESSURE: 120 MMHG | RESPIRATION RATE: 12 BRPM | DIASTOLIC BLOOD PRESSURE: 60 MMHG

## 2017-04-10 DIAGNOSIS — Z90.89 ACQUIRED ABSENCE OF OTHER ORGANS: Chronic | ICD-10-CM

## 2017-04-10 DIAGNOSIS — Z87.898 PERSONAL HISTORY OF OTHER SPECIFIED CONDITIONS: ICD-10-CM

## 2017-04-10 DIAGNOSIS — Z90.710 ACQUIRED ABSENCE OF BOTH CERVIX AND UTERUS: Chronic | ICD-10-CM

## 2017-04-10 DIAGNOSIS — H81.10 BENIGN PAROXYSMAL VERTIGO, UNSPECIFIED EAR: ICD-10-CM

## 2017-04-10 DIAGNOSIS — Z98.49 CATARACT EXTRACTION STATUS, UNSPECIFIED EYE: Chronic | ICD-10-CM

## 2017-04-10 DIAGNOSIS — Z78.9 OTHER SPECIFIED HEALTH STATUS: ICD-10-CM

## 2017-04-10 DIAGNOSIS — Z98.890 OTHER SPECIFIED POSTPROCEDURAL STATES: Chronic | ICD-10-CM

## 2017-04-10 DIAGNOSIS — C80.1 MALIGNANT (PRIMARY) NEOPLASM, UNSPECIFIED: ICD-10-CM

## 2017-04-13 RX ORDER — SODIUM CHLORIDE 9 MG/ML
3 INJECTION INTRAMUSCULAR; INTRAVENOUS; SUBCUTANEOUS ONCE
Qty: 0 | Refills: 0 | Status: DISCONTINUED | OUTPATIENT
Start: 2017-04-14 | End: 2017-04-29

## 2017-04-14 ENCOUNTER — OUTPATIENT (OUTPATIENT)
Dept: OUTPATIENT SERVICES | Facility: HOSPITAL | Age: 67
LOS: 1 days | End: 2017-04-14
Payer: MEDICARE

## 2017-04-14 VITALS
OXYGEN SATURATION: 100 % | SYSTOLIC BLOOD PRESSURE: 142 MMHG | DIASTOLIC BLOOD PRESSURE: 66 MMHG | HEART RATE: 77 BPM | RESPIRATION RATE: 20 BRPM

## 2017-04-14 VITALS
TEMPERATURE: 97 F | WEIGHT: 144.84 LBS | DIASTOLIC BLOOD PRESSURE: 56 MMHG | HEIGHT: 68 IN | HEART RATE: 65 BPM | RESPIRATION RATE: 16 BRPM | OXYGEN SATURATION: 100 % | SYSTOLIC BLOOD PRESSURE: 127 MMHG

## 2017-04-14 DIAGNOSIS — Z98.49 CATARACT EXTRACTION STATUS, UNSPECIFIED EYE: Chronic | ICD-10-CM

## 2017-04-14 DIAGNOSIS — Z01.818 ENCOUNTER FOR OTHER PREPROCEDURAL EXAMINATION: ICD-10-CM

## 2017-04-14 DIAGNOSIS — Z90.89 ACQUIRED ABSENCE OF OTHER ORGANS: Chronic | ICD-10-CM

## 2017-04-14 DIAGNOSIS — Z98.890 OTHER SPECIFIED POSTPROCEDURAL STATES: Chronic | ICD-10-CM

## 2017-04-14 DIAGNOSIS — Z90.710 ACQUIRED ABSENCE OF BOTH CERVIX AND UTERUS: Chronic | ICD-10-CM

## 2017-04-14 DIAGNOSIS — C21.0 MALIGNANT NEOPLASM OF ANUS, UNSPECIFIED: ICD-10-CM

## 2017-04-14 LAB
APTT BLD: 29.6 SEC — SIGNIFICANT CHANGE UP (ref 27.5–37.4)
INR BLD: 0.94 RATIO — SIGNIFICANT CHANGE UP (ref 0.88–1.16)
PROTHROM AB SERPL-ACNC: 10.3 SEC — SIGNIFICANT CHANGE UP (ref 9.8–12.7)

## 2017-04-14 PROCEDURE — 76937 US GUIDE VASCULAR ACCESS: CPT | Mod: 26

## 2017-04-14 PROCEDURE — 36415 COLL VENOUS BLD VENIPUNCTURE: CPT

## 2017-04-14 PROCEDURE — 85730 THROMBOPLASTIN TIME PARTIAL: CPT

## 2017-04-14 PROCEDURE — 76000 FLUOROSCOPY <1 HR PHYS/QHP: CPT

## 2017-04-14 PROCEDURE — 36561 INSERT TUNNELED CV CATH: CPT

## 2017-04-14 PROCEDURE — C1788: CPT

## 2017-04-14 PROCEDURE — 76942 ECHO GUIDE FOR BIOPSY: CPT

## 2017-04-14 PROCEDURE — 77301 RADIOTHERAPY DOSE PLAN IMRT: CPT | Mod: 26

## 2017-04-14 PROCEDURE — 77300 RADIATION THERAPY DOSE PLAN: CPT | Mod: 26

## 2017-04-14 PROCEDURE — 77001 FLUOROGUIDE FOR VEIN DEVICE: CPT | Mod: 26

## 2017-04-14 PROCEDURE — 85610 PROTHROMBIN TIME: CPT

## 2017-04-14 PROCEDURE — 77338 DESIGN MLC DEVICE FOR IMRT: CPT | Mod: 26

## 2017-04-14 PROCEDURE — C1894: CPT

## 2017-04-14 RX ORDER — ROSUVASTATIN CALCIUM 5 MG/1
0 TABLET ORAL
Qty: 0 | Refills: 0 | COMMUNITY

## 2017-04-14 RX ORDER — ESTROGENS, CONJUGATED 0.625 MG
45 TABLET ORAL
Qty: 0 | Refills: 0 | COMMUNITY

## 2017-04-14 RX ORDER — ESTROGENS, CONJUGATED 0.625 MG
0 TABLET ORAL
Qty: 0 | Refills: 0 | COMMUNITY

## 2017-04-14 RX ORDER — SODIUM CHLORIDE 9 MG/ML
1000 INJECTION INTRAMUSCULAR; INTRAVENOUS; SUBCUTANEOUS
Qty: 0 | Refills: 0 | Status: DISCONTINUED | OUTPATIENT
Start: 2017-04-14 | End: 2017-04-14

## 2017-04-14 RX ORDER — FLUTICASONE PROPIONATE 50 MCG
0 SPRAY, SUSPENSION NASAL
Qty: 0 | Refills: 0 | COMMUNITY

## 2017-04-14 RX ORDER — FLUTICASONE PROPIONATE 50 MCG
1 SPRAY, SUSPENSION NASAL
Qty: 0 | Refills: 0 | COMMUNITY

## 2017-04-14 RX ORDER — HYDROMORPHONE HYDROCHLORIDE 2 MG/ML
0.5 INJECTION INTRAMUSCULAR; INTRAVENOUS; SUBCUTANEOUS
Qty: 0 | Refills: 0 | Status: DISCONTINUED | OUTPATIENT
Start: 2017-04-14 | End: 2017-04-14

## 2017-04-14 RX ORDER — ONDANSETRON 8 MG/1
4 TABLET, FILM COATED ORAL ONCE
Qty: 0 | Refills: 0 | Status: DISCONTINUED | OUTPATIENT
Start: 2017-04-14 | End: 2017-04-14

## 2017-04-14 NOTE — ASU DISCHARGE PLAN (ADULT/PEDIATRIC). - MEDICATION SUMMARY - MEDICATIONS TO TAKE
I will START or STAY ON the medications listed below when I get home from the hospital:    acetaminophen-oxyCODONE 325 mg-5 mg oral tablet  -- 1 tab(s) by mouth every 4 hours, As needed, Moderate Pain  -- Indication: For home    Allegra 60 mg oral capsule  -- 1  by mouth once a day, As Needed  -- Indication: For agnes    Crestor  -- 5  mg by mouth   -- Indication: For home    metoprolol tartrate 50 mg oral tablet  --  by mouth once a day  -- new medication just started taking it last night   -- Indication: For home    Premarin 0.45 mg oral tablet  -- 1 tab(s) by mouth once a day  -- Indication: For home

## 2017-04-16 ENCOUNTER — RESULT REVIEW (OUTPATIENT)
Age: 67
End: 2017-04-16

## 2017-04-17 ENCOUNTER — APPOINTMENT (OUTPATIENT)
Dept: HEMATOLOGY ONCOLOGY | Facility: CLINIC | Age: 67
End: 2017-04-17

## 2017-04-17 VITALS
BODY MASS INDEX: 21.57 KG/M2 | TEMPERATURE: 97.7 F | RESPIRATION RATE: 16 BRPM | OXYGEN SATURATION: 99 % | WEIGHT: 146.06 LBS | DIASTOLIC BLOOD PRESSURE: 72 MMHG | HEART RATE: 64 BPM | SYSTOLIC BLOOD PRESSURE: 147 MMHG

## 2017-04-17 DIAGNOSIS — C21.0 MALIGNANT NEOPLASM OF ANUS, UNSPECIFIED: ICD-10-CM

## 2017-04-17 DIAGNOSIS — Z01.818 ENCOUNTER FOR OTHER PREPROCEDURAL EXAMINATION: ICD-10-CM

## 2017-04-17 LAB
BASOPHILS # BLD AUTO: 0.1 K/UL — SIGNIFICANT CHANGE UP (ref 0–0.2)
BASOPHILS NFR BLD AUTO: 1.1 % — SIGNIFICANT CHANGE UP (ref 0–2)
EOSINOPHIL # BLD AUTO: 0.2 K/UL — SIGNIFICANT CHANGE UP (ref 0–0.5)
EOSINOPHIL NFR BLD AUTO: 3.4 % — SIGNIFICANT CHANGE UP (ref 0–6)
HCT VFR BLD CALC: 37.9 % — SIGNIFICANT CHANGE UP (ref 34.5–45)
HGB BLD-MCNC: 12.3 G/DL — SIGNIFICANT CHANGE UP (ref 11.5–15.5)
LYMPHOCYTES # BLD AUTO: 1.4 K/UL — SIGNIFICANT CHANGE UP (ref 1–3.3)
LYMPHOCYTES # BLD AUTO: 29.1 % — SIGNIFICANT CHANGE UP (ref 13–44)
MCHC RBC-ENTMCNC: 31.2 PG — SIGNIFICANT CHANGE UP (ref 27–34)
MCHC RBC-ENTMCNC: 32.5 GM/DL — SIGNIFICANT CHANGE UP (ref 32–36)
MCV RBC AUTO: 96.2 FL — SIGNIFICANT CHANGE UP (ref 80–100)
MONOCYTES # BLD AUTO: 0.4 K/UL — SIGNIFICANT CHANGE UP (ref 0–0.9)
MONOCYTES NFR BLD AUTO: 8 % — SIGNIFICANT CHANGE UP (ref 2–14)
NEUTROPHILS # BLD AUTO: 2.8 K/UL — SIGNIFICANT CHANGE UP (ref 1.8–7.4)
NEUTROPHILS NFR BLD AUTO: 58.4 % — SIGNIFICANT CHANGE UP (ref 43–77)
PLATELET # BLD AUTO: 177 K/UL — SIGNIFICANT CHANGE UP (ref 150–400)
RBC # BLD: 3.94 M/UL — SIGNIFICANT CHANGE UP (ref 3.8–5.2)
RBC # FLD: 11.5 % — SIGNIFICANT CHANGE UP (ref 10.3–14.5)
WBC # BLD: 4.7 K/UL — SIGNIFICANT CHANGE UP (ref 3.8–10.5)
WBC # FLD AUTO: 4.7 K/UL — SIGNIFICANT CHANGE UP (ref 3.8–10.5)

## 2017-04-18 LAB
ALBUMIN SERPL ELPH-MCNC: 3.8 G/DL
ALP BLD-CCNC: 61 U/L
ALT SERPL-CCNC: 15 U/L
ANION GAP SERPL CALC-SCNC: 17 MMOL/L
AST SERPL-CCNC: 18 U/L
BILIRUB SERPL-MCNC: <0.2 MG/DL
BUN SERPL-MCNC: 20 MG/DL
CALCIUM SERPL-MCNC: 9 MG/DL
CHLORIDE SERPL-SCNC: 104 MMOL/L
CO2 SERPL-SCNC: 21 MMOL/L
CREAT SERPL-MCNC: 0.87 MG/DL
GLUCOSE SERPL-MCNC: 117 MG/DL
MAGNESIUM SERPL-MCNC: 2.2 MG/DL
POTASSIUM SERPL-SCNC: 4.2 MMOL/L
PROT SERPL-MCNC: 6.2 G/DL
SODIUM SERPL-SCNC: 142 MMOL/L

## 2017-04-18 PROCEDURE — 77280 THER RAD SIMULAJ FIELD SMPL: CPT | Mod: 26

## 2017-04-19 ENCOUNTER — LABORATORY RESULT (OUTPATIENT)
Age: 67
End: 2017-04-19

## 2017-04-19 ENCOUNTER — CLINICAL ADVICE (OUTPATIENT)
Age: 67
End: 2017-04-19

## 2017-04-19 ENCOUNTER — APPOINTMENT (OUTPATIENT)
Dept: INFUSION THERAPY | Facility: CLINIC | Age: 67
End: 2017-04-19

## 2017-04-19 PROCEDURE — 77387B: CUSTOM | Mod: 26

## 2017-04-19 PROCEDURE — 77427 RADIATION TX MANAGEMENT X5: CPT

## 2017-04-20 PROCEDURE — 77387B: CUSTOM | Mod: 26

## 2017-04-21 DIAGNOSIS — Z51.11 ENCOUNTER FOR ANTINEOPLASTIC CHEMOTHERAPY: ICD-10-CM

## 2017-04-21 DIAGNOSIS — R11.2 NAUSEA WITH VOMITING, UNSPECIFIED: ICD-10-CM

## 2017-04-24 VITALS
OXYGEN SATURATION: 97 % | SYSTOLIC BLOOD PRESSURE: 149 MMHG | BODY MASS INDEX: 21.45 KG/M2 | WEIGHT: 144.8 LBS | TEMPERATURE: 98.8 F | RESPIRATION RATE: 16 BRPM | DIASTOLIC BLOOD PRESSURE: 75 MMHG | HEIGHT: 69 IN | HEART RATE: 72 BPM

## 2017-04-24 PROCEDURE — 77387B: CUSTOM | Mod: 26

## 2017-04-25 PROCEDURE — 77387B: CUSTOM | Mod: 26

## 2017-04-26 PROCEDURE — 77387B: CUSTOM | Mod: 26

## 2017-04-27 PROCEDURE — 77387B: CUSTOM | Mod: 26

## 2017-04-27 PROCEDURE — 77427 RADIATION TX MANAGEMENT X5: CPT

## 2017-04-28 PROCEDURE — 77387B: CUSTOM | Mod: 26

## 2017-04-30 ENCOUNTER — RESULT REVIEW (OUTPATIENT)
Age: 67
End: 2017-04-30

## 2017-05-01 ENCOUNTER — APPOINTMENT (OUTPATIENT)
Dept: HEMATOLOGY ONCOLOGY | Facility: CLINIC | Age: 67
End: 2017-05-01

## 2017-05-01 VITALS
SYSTOLIC BLOOD PRESSURE: 134 MMHG | RESPIRATION RATE: 16 BRPM | OXYGEN SATURATION: 98 % | TEMPERATURE: 97.8 F | WEIGHT: 143.52 LBS | HEART RATE: 76 BPM | BODY MASS INDEX: 21.19 KG/M2 | DIASTOLIC BLOOD PRESSURE: 73 MMHG

## 2017-05-01 VITALS
BODY MASS INDEX: 21.3 KG/M2 | DIASTOLIC BLOOD PRESSURE: 73 MMHG | RESPIRATION RATE: 16 BRPM | TEMPERATURE: 97.8 F | SYSTOLIC BLOOD PRESSURE: 134 MMHG | WEIGHT: 143.8 LBS | HEIGHT: 69 IN | HEART RATE: 76 BPM | OXYGEN SATURATION: 98 %

## 2017-05-01 PROCEDURE — 77427 RADIATION TX MANAGEMENT X5: CPT

## 2017-05-01 PROCEDURE — 77387B: CUSTOM | Mod: 26

## 2017-05-02 LAB
ALBUMIN SERPL ELPH-MCNC: 3.7 G/DL
ALP BLD-CCNC: 60 U/L
ALT SERPL-CCNC: 13 U/L
ANION GAP SERPL CALC-SCNC: 15 MMOL/L
AST SERPL-CCNC: 19 U/L
BILIRUB SERPL-MCNC: 0.2 MG/DL
BUN SERPL-MCNC: 15 MG/DL
CALCIUM SERPL-MCNC: 8.6 MG/DL
CHLORIDE SERPL-SCNC: 103 MMOL/L
CO2 SERPL-SCNC: 23 MMOL/L
CREAT SERPL-MCNC: 0.83 MG/DL
GLUCOSE SERPL-MCNC: 106 MG/DL
MAGNESIUM SERPL-MCNC: 2.2 MG/DL
POTASSIUM SERPL-SCNC: 4.1 MMOL/L
PROT SERPL-MCNC: 6.4 G/DL
SODIUM SERPL-SCNC: 141 MMOL/L

## 2017-05-02 PROCEDURE — 77387B: CUSTOM | Mod: 26

## 2017-05-03 PROCEDURE — 77387B: CUSTOM | Mod: 26

## 2017-05-04 PROCEDURE — 77427 RADIATION TX MANAGEMENT X5: CPT | Mod: 26

## 2017-05-04 PROCEDURE — 77387B: CUSTOM | Mod: 26

## 2017-05-05 ENCOUNTER — OUTPATIENT (OUTPATIENT)
Dept: OUTPATIENT SERVICES | Facility: HOSPITAL | Age: 67
LOS: 1 days | Discharge: HOME | End: 2017-05-05

## 2017-05-05 DIAGNOSIS — Z90.89 ACQUIRED ABSENCE OF OTHER ORGANS: Chronic | ICD-10-CM

## 2017-05-05 DIAGNOSIS — Z98.890 OTHER SPECIFIED POSTPROCEDURAL STATES: Chronic | ICD-10-CM

## 2017-05-05 DIAGNOSIS — C80.1 MALIGNANT (PRIMARY) NEOPLASM, UNSPECIFIED: ICD-10-CM

## 2017-05-05 DIAGNOSIS — Z90.710 ACQUIRED ABSENCE OF BOTH CERVIX AND UTERUS: Chronic | ICD-10-CM

## 2017-05-05 DIAGNOSIS — Z98.49 CATARACT EXTRACTION STATUS, UNSPECIFIED EYE: Chronic | ICD-10-CM

## 2017-05-05 PROCEDURE — 77387B: CUSTOM | Mod: 26

## 2017-05-08 ENCOUNTER — RESULT REVIEW (OUTPATIENT)
Age: 67
End: 2017-05-08

## 2017-05-08 ENCOUNTER — APPOINTMENT (OUTPATIENT)
Dept: HEMATOLOGY ONCOLOGY | Facility: CLINIC | Age: 67
End: 2017-05-08

## 2017-05-08 VITALS
WEIGHT: 144 LBS | HEIGHT: 69 IN | DIASTOLIC BLOOD PRESSURE: 66 MMHG | HEART RATE: 77 BPM | SYSTOLIC BLOOD PRESSURE: 118 MMHG | TEMPERATURE: 97.8 F | RESPIRATION RATE: 16 BRPM | OXYGEN SATURATION: 100 % | BODY MASS INDEX: 21.33 KG/M2

## 2017-05-08 VITALS
WEIGHT: 143.52 LBS | OXYGEN SATURATION: 100 % | TEMPERATURE: 97.8 F | DIASTOLIC BLOOD PRESSURE: 76 MMHG | BODY MASS INDEX: 21.19 KG/M2 | RESPIRATION RATE: 16 BRPM | SYSTOLIC BLOOD PRESSURE: 136 MMHG | HEART RATE: 71 BPM

## 2017-05-08 LAB
BASOPHILS # BLD AUTO: 0 K/UL — SIGNIFICANT CHANGE UP (ref 0–0.2)
BASOPHILS NFR BLD AUTO: 1.1 % — SIGNIFICANT CHANGE UP (ref 0–2)
EOSINOPHIL # BLD AUTO: 0.3 K/UL — SIGNIFICANT CHANGE UP (ref 0–0.5)
EOSINOPHIL NFR BLD AUTO: 9.9 % — HIGH (ref 0–6)
HCT VFR BLD CALC: 32.7 % — LOW (ref 34.5–45)
HGB BLD-MCNC: 11.1 G/DL — LOW (ref 11.5–15.5)
LYMPHOCYTES # BLD AUTO: 0.3 K/UL — LOW (ref 1–3.3)
LYMPHOCYTES # BLD AUTO: 12 % — LOW (ref 13–44)
MCHC RBC-ENTMCNC: 32.3 PG — SIGNIFICANT CHANGE UP (ref 27–34)
MCHC RBC-ENTMCNC: 34.1 GM/DL — SIGNIFICANT CHANGE UP (ref 32–36)
MCV RBC AUTO: 95 FL — SIGNIFICANT CHANGE UP (ref 80–100)
MONOCYTES # BLD AUTO: 0.5 K/UL — SIGNIFICANT CHANGE UP (ref 0–0.9)
MONOCYTES NFR BLD AUTO: 16.1 % — HIGH (ref 2–14)
NEUTROPHILS # BLD AUTO: 1.7 K/UL — LOW (ref 1.8–7.4)
NEUTROPHILS NFR BLD AUTO: 60.9 % — SIGNIFICANT CHANGE UP (ref 43–77)
PLATELET # BLD AUTO: 168 K/UL — SIGNIFICANT CHANGE UP (ref 150–400)
RBC # BLD: 3.44 M/UL — LOW (ref 3.8–5.2)
RBC # FLD: 11.8 % — SIGNIFICANT CHANGE UP (ref 10.3–14.5)
WBC # BLD: 2.9 K/UL — LOW (ref 3.8–10.5)
WBC # FLD AUTO: 2.9 K/UL — LOW (ref 3.8–10.5)

## 2017-05-08 PROCEDURE — 77387B: CUSTOM | Mod: 26

## 2017-05-09 LAB
ALBUMIN SERPL ELPH-MCNC: 3.8 G/DL
ALP BLD-CCNC: 57 U/L
ALT SERPL-CCNC: 10 U/L
ANION GAP SERPL CALC-SCNC: 15 MMOL/L
AST SERPL-CCNC: 15 U/L
BILIRUB SERPL-MCNC: 0.2 MG/DL
BUN SERPL-MCNC: 14 MG/DL
CALCIUM SERPL-MCNC: 8.9 MG/DL
CHLORIDE SERPL-SCNC: 103 MMOL/L
CO2 SERPL-SCNC: 24 MMOL/L
CREAT SERPL-MCNC: 0.75 MG/DL
GLUCOSE SERPL-MCNC: 101 MG/DL
MAGNESIUM SERPL-MCNC: 2.3 MG/DL
POTASSIUM SERPL-SCNC: 4.8 MMOL/L
PROT SERPL-MCNC: 6.3 G/DL
SODIUM SERPL-SCNC: 142 MMOL/L

## 2017-05-09 PROCEDURE — 77387B: CUSTOM | Mod: 26

## 2017-05-10 PROCEDURE — 77387B: CUSTOM | Mod: 26

## 2017-05-11 PROCEDURE — 77387B: CUSTOM | Mod: 26

## 2017-05-11 PROCEDURE — 77427 RADIATION TX MANAGEMENT X5: CPT

## 2017-05-12 PROCEDURE — 77387B: CUSTOM | Mod: 26

## 2017-05-15 ENCOUNTER — APPOINTMENT (OUTPATIENT)
Dept: HEMATOLOGY ONCOLOGY | Facility: CLINIC | Age: 67
End: 2017-05-15

## 2017-05-15 VITALS
SYSTOLIC BLOOD PRESSURE: 139 MMHG | RESPIRATION RATE: 16 BRPM | BODY MASS INDEX: 20.73 KG/M2 | OXYGEN SATURATION: 97 % | HEART RATE: 82 BPM | WEIGHT: 140 LBS | TEMPERATURE: 98.2 F | DIASTOLIC BLOOD PRESSURE: 72 MMHG | HEIGHT: 69 IN

## 2017-05-15 PROCEDURE — 77387B: CUSTOM | Mod: 26

## 2017-05-16 PROCEDURE — 77387B: CUSTOM | Mod: 26

## 2017-05-17 ENCOUNTER — RESULT REVIEW (OUTPATIENT)
Age: 67
End: 2017-05-17

## 2017-05-17 ENCOUNTER — APPOINTMENT (OUTPATIENT)
Dept: INFUSION THERAPY | Facility: CLINIC | Age: 67
End: 2017-05-17

## 2017-05-17 ENCOUNTER — LABORATORY RESULT (OUTPATIENT)
Age: 67
End: 2017-05-17

## 2017-05-17 LAB
BASOPHILS # BLD AUTO: 0 K/UL — SIGNIFICANT CHANGE UP (ref 0–0.2)
BASOPHILS NFR BLD AUTO: 0.8 % — SIGNIFICANT CHANGE UP (ref 0–2)
EOSINOPHIL # BLD AUTO: 0.2 K/UL — SIGNIFICANT CHANGE UP (ref 0–0.5)
EOSINOPHIL NFR BLD AUTO: 4.9 % — SIGNIFICANT CHANGE UP (ref 0–6)
HCT VFR BLD CALC: 35.4 % — SIGNIFICANT CHANGE UP (ref 34.5–45)
HGB BLD-MCNC: 12.2 G/DL — SIGNIFICANT CHANGE UP (ref 11.5–15.5)
LYMPHOCYTES # BLD AUTO: 0.3 K/UL — LOW (ref 1–3.3)
LYMPHOCYTES # BLD AUTO: 8 % — LOW (ref 13–44)
MCHC RBC-ENTMCNC: 33.1 PG — SIGNIFICANT CHANGE UP (ref 27–34)
MCHC RBC-ENTMCNC: 34.5 GM/DL — SIGNIFICANT CHANGE UP (ref 32–36)
MCV RBC AUTO: 95.9 FL — SIGNIFICANT CHANGE UP (ref 80–100)
MONOCYTES # BLD AUTO: 0.5 K/UL — SIGNIFICANT CHANGE UP (ref 0–0.9)
MONOCYTES NFR BLD AUTO: 11.1 % — SIGNIFICANT CHANGE UP (ref 2–14)
NEUTROPHILS # BLD AUTO: 3.2 K/UL — SIGNIFICANT CHANGE UP (ref 1.8–7.4)
NEUTROPHILS NFR BLD AUTO: 75.2 % — SIGNIFICANT CHANGE UP (ref 43–77)
PLATELET # BLD AUTO: 200 K/UL — SIGNIFICANT CHANGE UP (ref 150–400)
RBC # BLD: 3.68 M/UL — LOW (ref 3.8–5.2)
RBC # FLD: 13.4 % — SIGNIFICANT CHANGE UP (ref 10.3–14.5)
WBC # BLD: 4.3 K/UL — SIGNIFICANT CHANGE UP (ref 3.8–10.5)
WBC # FLD AUTO: 4.3 K/UL — SIGNIFICANT CHANGE UP (ref 3.8–10.5)

## 2017-05-17 PROCEDURE — 77387B: CUSTOM | Mod: 26

## 2017-05-18 DIAGNOSIS — R11.2 NAUSEA WITH VOMITING, UNSPECIFIED: ICD-10-CM

## 2017-05-18 DIAGNOSIS — Z51.11 ENCOUNTER FOR ANTINEOPLASTIC CHEMOTHERAPY: ICD-10-CM

## 2017-05-18 PROCEDURE — 77387B: CUSTOM | Mod: 26

## 2017-05-18 PROCEDURE — 77427 RADIATION TX MANAGEMENT X5: CPT

## 2017-05-19 PROCEDURE — 77387B: CUSTOM | Mod: 26

## 2017-05-22 ENCOUNTER — OTHER (OUTPATIENT)
Age: 67
End: 2017-05-22

## 2017-05-22 VITALS
DIASTOLIC BLOOD PRESSURE: 73 MMHG | SYSTOLIC BLOOD PRESSURE: 151 MMHG | HEIGHT: 69 IN | OXYGEN SATURATION: 97 % | BODY MASS INDEX: 23.61 KG/M2 | HEART RATE: 80 BPM | RESPIRATION RATE: 16 BRPM | TEMPERATURE: 98 F | WEIGHT: 159.4 LBS

## 2017-05-22 PROCEDURE — 77387B: CUSTOM | Mod: 26

## 2017-05-23 PROCEDURE — 77387B: CUSTOM | Mod: 26

## 2017-05-24 ENCOUNTER — APPOINTMENT (OUTPATIENT)
Dept: HEMATOLOGY ONCOLOGY | Facility: CLINIC | Age: 67
End: 2017-05-24

## 2017-05-24 ENCOUNTER — RESULT REVIEW (OUTPATIENT)
Age: 67
End: 2017-05-24

## 2017-05-24 VITALS
BODY MASS INDEX: 20.43 KG/M2 | WEIGHT: 138.34 LBS | RESPIRATION RATE: 16 BRPM | OXYGEN SATURATION: 100 % | SYSTOLIC BLOOD PRESSURE: 139 MMHG | HEART RATE: 97 BPM | DIASTOLIC BLOOD PRESSURE: 71 MMHG | TEMPERATURE: 98.1 F

## 2017-05-24 LAB
BASOPHILS # BLD AUTO: 0 K/UL — SIGNIFICANT CHANGE UP (ref 0–0.2)
BASOPHILS NFR BLD AUTO: 0.3 % — SIGNIFICANT CHANGE UP (ref 0–2)
EOSINOPHIL # BLD AUTO: 0.2 K/UL — SIGNIFICANT CHANGE UP (ref 0–0.5)
EOSINOPHIL NFR BLD AUTO: 12.1 % — HIGH (ref 0–6)
HCT VFR BLD CALC: 33.1 % — LOW (ref 34.5–45)
HGB BLD-MCNC: 11.2 G/DL — LOW (ref 11.5–15.5)
LYMPHOCYTES # BLD AUTO: 0.1 K/UL — LOW (ref 1–3.3)
LYMPHOCYTES # BLD AUTO: 4.1 % — LOW (ref 13–44)
MCHC RBC-ENTMCNC: 32.5 PG — SIGNIFICANT CHANGE UP (ref 27–34)
MCHC RBC-ENTMCNC: 34 GM/DL — SIGNIFICANT CHANGE UP (ref 32–36)
MCV RBC AUTO: 95.7 FL — SIGNIFICANT CHANGE UP (ref 80–100)
MONOCYTES # BLD AUTO: 0.1 K/UL — SIGNIFICANT CHANGE UP (ref 0–0.9)
MONOCYTES NFR BLD AUTO: 2.9 % — SIGNIFICANT CHANGE UP (ref 2–14)
NEUTROPHILS # BLD AUTO: 1.4 K/UL — LOW (ref 1.8–7.4)
NEUTROPHILS NFR BLD AUTO: 80.5 % — HIGH (ref 43–77)
PLATELET # BLD AUTO: 178 K/UL — SIGNIFICANT CHANGE UP (ref 150–400)
RBC # BLD: 3.46 M/UL — LOW (ref 3.8–5.2)
RBC # FLD: 12.8 % — SIGNIFICANT CHANGE UP (ref 10.3–14.5)
WBC # BLD: 1.8 K/UL — LOW (ref 3.8–10.5)
WBC # FLD AUTO: 1.8 K/UL — LOW (ref 3.8–10.5)

## 2017-05-24 PROCEDURE — 77387B: CUSTOM | Mod: 26

## 2017-05-25 PROCEDURE — 77427 RADIATION TX MANAGEMENT X5: CPT

## 2017-05-25 PROCEDURE — 77387B: CUSTOM | Mod: 26

## 2017-05-26 LAB
ALBUMIN SERPL ELPH-MCNC: 3.8 G/DL
ALP BLD-CCNC: 50 U/L
ALT SERPL-CCNC: 9 U/L
ANION GAP SERPL CALC-SCNC: 15 MMOL/L
AST SERPL-CCNC: 15 U/L
BILIRUB SERPL-MCNC: 0.4 MG/DL
BUN SERPL-MCNC: 11 MG/DL
CALCIUM SERPL-MCNC: 8.6 MG/DL
CHLORIDE SERPL-SCNC: 100 MMOL/L
CO2 SERPL-SCNC: 23 MMOL/L
CREAT SERPL-MCNC: 0.75 MG/DL
GLUCOSE SERPL-MCNC: 115 MG/DL
MAGNESIUM SERPL-MCNC: 2.1 MG/DL
POTASSIUM SERPL-SCNC: 3.8 MMOL/L
PROT SERPL-MCNC: 6.1 G/DL
SODIUM SERPL-SCNC: 138 MMOL/L

## 2017-05-26 PROCEDURE — 77387B: CUSTOM | Mod: 26

## 2017-05-26 PROCEDURE — 77427 RADIATION TX MANAGEMENT X5: CPT

## 2017-05-30 ENCOUNTER — OTHER (OUTPATIENT)
Age: 67
End: 2017-05-30

## 2017-05-30 VITALS
HEART RATE: 98 BPM | TEMPERATURE: 98.1 F | BODY MASS INDEX: 21.03 KG/M2 | OXYGEN SATURATION: 98 % | WEIGHT: 142 LBS | HEIGHT: 69 IN | RESPIRATION RATE: 16 BRPM | DIASTOLIC BLOOD PRESSURE: 73 MMHG | SYSTOLIC BLOOD PRESSURE: 122 MMHG

## 2017-05-30 PROCEDURE — 77387B: CUSTOM | Mod: 26

## 2017-05-31 PROCEDURE — 77387B: CUSTOM | Mod: 26

## 2017-06-01 PROCEDURE — 77387B: CUSTOM | Mod: 26

## 2017-06-14 ENCOUNTER — APPOINTMENT (OUTPATIENT)
Dept: COLORECTAL SURGERY | Facility: CLINIC | Age: 67
End: 2017-06-14

## 2017-06-14 ENCOUNTER — APPOINTMENT (OUTPATIENT)
Dept: INFUSION THERAPY | Facility: CLINIC | Age: 67
End: 2017-06-14

## 2017-06-14 ENCOUNTER — APPOINTMENT (OUTPATIENT)
Dept: HEMATOLOGY ONCOLOGY | Facility: CLINIC | Age: 67
End: 2017-06-14

## 2017-06-15 ENCOUNTER — OUTPATIENT (OUTPATIENT)
Dept: OUTPATIENT SERVICES | Facility: HOSPITAL | Age: 67
LOS: 1 days | Discharge: HOME | End: 2017-06-15

## 2017-06-15 DIAGNOSIS — Z98.890 OTHER SPECIFIED POSTPROCEDURAL STATES: Chronic | ICD-10-CM

## 2017-06-15 DIAGNOSIS — Z90.710 ACQUIRED ABSENCE OF BOTH CERVIX AND UTERUS: Chronic | ICD-10-CM

## 2017-06-15 DIAGNOSIS — Z98.49 CATARACT EXTRACTION STATUS, UNSPECIFIED EYE: Chronic | ICD-10-CM

## 2017-06-15 DIAGNOSIS — Z90.89 ACQUIRED ABSENCE OF OTHER ORGANS: Chronic | ICD-10-CM

## 2017-06-15 DIAGNOSIS — C80.1 MALIGNANT (PRIMARY) NEOPLASM, UNSPECIFIED: ICD-10-CM

## 2017-06-19 ENCOUNTER — LABORATORY RESULT (OUTPATIENT)
Age: 67
End: 2017-06-19

## 2017-06-19 ENCOUNTER — APPOINTMENT (OUTPATIENT)
Dept: HEMATOLOGY ONCOLOGY | Facility: CLINIC | Age: 67
End: 2017-06-19

## 2017-06-19 ENCOUNTER — RESULT REVIEW (OUTPATIENT)
Age: 67
End: 2017-06-19

## 2017-06-19 ENCOUNTER — APPOINTMENT (OUTPATIENT)
Dept: INFUSION THERAPY | Facility: CLINIC | Age: 67
End: 2017-06-19

## 2017-06-19 VITALS
WEIGHT: 133.05 LBS | HEART RATE: 83 BPM | DIASTOLIC BLOOD PRESSURE: 63 MMHG | OXYGEN SATURATION: 97 % | RESPIRATION RATE: 16 BRPM | SYSTOLIC BLOOD PRESSURE: 133 MMHG | TEMPERATURE: 98 F | BODY MASS INDEX: 19.65 KG/M2

## 2017-06-19 LAB
BASOPHILS # BLD AUTO: 0 K/UL — SIGNIFICANT CHANGE UP (ref 0–0.2)
BASOPHILS NFR BLD AUTO: 0.9 % — SIGNIFICANT CHANGE UP (ref 0–2)
EOSINOPHIL # BLD AUTO: 0.2 K/UL — SIGNIFICANT CHANGE UP (ref 0–0.5)
EOSINOPHIL NFR BLD AUTO: 5 % — SIGNIFICANT CHANGE UP (ref 0–6)
HCT VFR BLD CALC: 30.3 % — LOW (ref 34.5–45)
HGB BLD-MCNC: 10.1 G/DL — LOW (ref 11.5–15.5)
LYMPHOCYTES # BLD AUTO: 1.3 K/UL — SIGNIFICANT CHANGE UP (ref 1–3.3)
LYMPHOCYTES # BLD AUTO: 34 % — SIGNIFICANT CHANGE UP (ref 13–44)
MCHC RBC-ENTMCNC: 32.4 PG — SIGNIFICANT CHANGE UP (ref 27–34)
MCHC RBC-ENTMCNC: 33.3 GM/DL — SIGNIFICANT CHANGE UP (ref 32–36)
MCV RBC AUTO: 97.5 FL — SIGNIFICANT CHANGE UP (ref 80–100)
MONOCYTES # BLD AUTO: 0.4 K/UL — SIGNIFICANT CHANGE UP (ref 0–0.9)
MONOCYTES NFR BLD AUTO: 10.6 % — SIGNIFICANT CHANGE UP (ref 2–14)
NEUTROPHILS # BLD AUTO: 1.9 K/UL — SIGNIFICANT CHANGE UP (ref 1.8–7.4)
NEUTROPHILS NFR BLD AUTO: 49.5 % — SIGNIFICANT CHANGE UP (ref 43–77)
PLATELET # BLD AUTO: 258 K/UL — SIGNIFICANT CHANGE UP (ref 150–400)
RBC # BLD: 3.11 M/UL — LOW (ref 3.8–5.2)
RBC # FLD: 15.4 % — HIGH (ref 10.3–14.5)
WBC # BLD: 3.8 K/UL — SIGNIFICANT CHANGE UP (ref 3.8–10.5)
WBC # FLD AUTO: 3.8 K/UL — SIGNIFICANT CHANGE UP (ref 3.8–10.5)

## 2017-07-26 ENCOUNTER — APPOINTMENT (OUTPATIENT)
Dept: RADIATION ONCOLOGY | Facility: CLINIC | Age: 67
End: 2017-07-26

## 2017-07-26 VITALS
BODY MASS INDEX: 19.7 KG/M2 | OXYGEN SATURATION: 94 % | DIASTOLIC BLOOD PRESSURE: 70 MMHG | HEART RATE: 73 BPM | HEIGHT: 69 IN | WEIGHT: 133 LBS | RESPIRATION RATE: 16 BRPM | SYSTOLIC BLOOD PRESSURE: 123 MMHG | TEMPERATURE: 97.4 F

## 2017-08-09 ENCOUNTER — APPOINTMENT (OUTPATIENT)
Dept: COLORECTAL SURGERY | Facility: CLINIC | Age: 67
End: 2017-08-09
Payer: MEDICARE

## 2017-08-09 VITALS
HEIGHT: 69 IN | RESPIRATION RATE: 16 BRPM | WEIGHT: 133 LBS | BODY MASS INDEX: 19.7 KG/M2 | SYSTOLIC BLOOD PRESSURE: 122 MMHG | TEMPERATURE: 99.1 F | HEART RATE: 74 BPM | DIASTOLIC BLOOD PRESSURE: 66 MMHG

## 2017-08-09 PROCEDURE — 46600 DIAGNOSTIC ANOSCOPY SPX: CPT

## 2017-08-09 PROCEDURE — 99214 OFFICE O/P EST MOD 30 MIN: CPT | Mod: 25

## 2017-08-10 ENCOUNTER — OUTPATIENT (OUTPATIENT)
Dept: OUTPATIENT SERVICES | Facility: HOSPITAL | Age: 67
LOS: 1 days | Discharge: HOME | End: 2017-08-10

## 2017-08-10 DIAGNOSIS — Z90.710 ACQUIRED ABSENCE OF BOTH CERVIX AND UTERUS: Chronic | ICD-10-CM

## 2017-08-10 DIAGNOSIS — Z98.890 OTHER SPECIFIED POSTPROCEDURAL STATES: Chronic | ICD-10-CM

## 2017-08-10 DIAGNOSIS — C80.1 MALIGNANT (PRIMARY) NEOPLASM, UNSPECIFIED: ICD-10-CM

## 2017-08-10 DIAGNOSIS — Z90.89 ACQUIRED ABSENCE OF OTHER ORGANS: Chronic | ICD-10-CM

## 2017-08-10 DIAGNOSIS — Z98.49 CATARACT EXTRACTION STATUS, UNSPECIFIED EYE: Chronic | ICD-10-CM

## 2017-08-14 ENCOUNTER — LABORATORY RESULT (OUTPATIENT)
Age: 67
End: 2017-08-14

## 2017-08-14 ENCOUNTER — APPOINTMENT (OUTPATIENT)
Dept: INFUSION THERAPY | Facility: CLINIC | Age: 67
End: 2017-08-14

## 2017-08-14 ENCOUNTER — RESULT REVIEW (OUTPATIENT)
Age: 67
End: 2017-08-14

## 2017-08-14 ENCOUNTER — APPOINTMENT (OUTPATIENT)
Dept: HEMATOLOGY ONCOLOGY | Facility: CLINIC | Age: 67
End: 2017-08-14
Payer: MEDICARE

## 2017-08-14 VITALS
SYSTOLIC BLOOD PRESSURE: 132 MMHG | HEART RATE: 67 BPM | OXYGEN SATURATION: 100 % | RESPIRATION RATE: 16 BRPM | BODY MASS INDEX: 19.55 KG/M2 | TEMPERATURE: 98.3 F | WEIGHT: 132.39 LBS | DIASTOLIC BLOOD PRESSURE: 68 MMHG

## 2017-08-14 LAB
BASOPHILS # BLD AUTO: 0 K/UL — SIGNIFICANT CHANGE UP (ref 0–0.2)
BASOPHILS NFR BLD AUTO: 1.2 % — SIGNIFICANT CHANGE UP (ref 0–2)
EOSINOPHIL # BLD AUTO: 0.2 K/UL — SIGNIFICANT CHANGE UP (ref 0–0.5)
EOSINOPHIL NFR BLD AUTO: 5.8 % — SIGNIFICANT CHANGE UP (ref 0–6)
HCT VFR BLD CALC: 31.6 % — LOW (ref 34.5–45)
HGB BLD-MCNC: 10.8 G/DL — LOW (ref 11.5–15.5)
LYMPHOCYTES # BLD AUTO: 1 K/UL — SIGNIFICANT CHANGE UP (ref 1–3.3)
LYMPHOCYTES # BLD AUTO: 28.3 % — SIGNIFICANT CHANGE UP (ref 13–44)
MCHC RBC-ENTMCNC: 34.2 GM/DL — SIGNIFICANT CHANGE UP (ref 32–36)
MCHC RBC-ENTMCNC: 36 PG — HIGH (ref 27–34)
MCV RBC AUTO: 105.3 FL — HIGH (ref 80–100)
MONOCYTES # BLD AUTO: 0.3 K/UL — SIGNIFICANT CHANGE UP (ref 0–0.9)
MONOCYTES NFR BLD AUTO: 9.3 % — SIGNIFICANT CHANGE UP (ref 2–14)
NEUTROPHILS # BLD AUTO: 1.9 K/UL — SIGNIFICANT CHANGE UP (ref 1.8–7.4)
NEUTROPHILS NFR BLD AUTO: 55.4 % — SIGNIFICANT CHANGE UP (ref 43–77)
PLATELET # BLD AUTO: 165 K/UL — SIGNIFICANT CHANGE UP (ref 150–400)
RBC # BLD: 3 M/UL — LOW (ref 3.8–5.2)
RBC # FLD: 12.2 % — SIGNIFICANT CHANGE UP (ref 10.3–14.5)
WBC # BLD: 3.5 K/UL — LOW (ref 3.8–10.5)
WBC # FLD AUTO: 3.5 K/UL — LOW (ref 3.8–10.5)

## 2017-08-14 PROCEDURE — 99214 OFFICE O/P EST MOD 30 MIN: CPT

## 2017-09-21 ENCOUNTER — OUTPATIENT (OUTPATIENT)
Dept: OUTPATIENT SERVICES | Facility: HOSPITAL | Age: 67
LOS: 1 days | Discharge: HOME | End: 2017-09-21

## 2017-09-21 DIAGNOSIS — Z90.89 ACQUIRED ABSENCE OF OTHER ORGANS: Chronic | ICD-10-CM

## 2017-09-21 DIAGNOSIS — C80.1 MALIGNANT (PRIMARY) NEOPLASM, UNSPECIFIED: ICD-10-CM

## 2017-09-21 DIAGNOSIS — Z90.710 ACQUIRED ABSENCE OF BOTH CERVIX AND UTERUS: Chronic | ICD-10-CM

## 2017-09-21 DIAGNOSIS — Z98.890 OTHER SPECIFIED POSTPROCEDURAL STATES: Chronic | ICD-10-CM

## 2017-09-21 DIAGNOSIS — Z98.49 CATARACT EXTRACTION STATUS, UNSPECIFIED EYE: Chronic | ICD-10-CM

## 2017-09-25 ENCOUNTER — LABORATORY RESULT (OUTPATIENT)
Age: 67
End: 2017-09-25

## 2017-09-25 ENCOUNTER — APPOINTMENT (OUTPATIENT)
Dept: HEMATOLOGY ONCOLOGY | Facility: CLINIC | Age: 67
End: 2017-09-25
Payer: MEDICARE

## 2017-09-25 ENCOUNTER — APPOINTMENT (OUTPATIENT)
Dept: INFUSION THERAPY | Facility: CLINIC | Age: 67
End: 2017-09-25

## 2017-09-25 ENCOUNTER — RESULT REVIEW (OUTPATIENT)
Age: 67
End: 2017-09-25

## 2017-09-25 VITALS
HEART RATE: 63 BPM | BODY MASS INDEX: 19.26 KG/M2 | WEIGHT: 130.38 LBS | OXYGEN SATURATION: 99 % | SYSTOLIC BLOOD PRESSURE: 117 MMHG | DIASTOLIC BLOOD PRESSURE: 63 MMHG | TEMPERATURE: 98.1 F

## 2017-09-25 LAB
BASOPHILS # BLD AUTO: 0 K/UL — SIGNIFICANT CHANGE UP (ref 0–0.2)
BASOPHILS NFR BLD AUTO: 1.2 % — SIGNIFICANT CHANGE UP (ref 0–2)
EOSINOPHIL # BLD AUTO: 0.4 K/UL — SIGNIFICANT CHANGE UP (ref 0–0.5)
EOSINOPHIL NFR BLD AUTO: 9.8 % — HIGH (ref 0–6)
HCT VFR BLD CALC: 33.9 % — LOW (ref 34.5–45)
HGB BLD-MCNC: 11.5 G/DL — SIGNIFICANT CHANGE UP (ref 11.5–15.5)
LYMPHOCYTES # BLD AUTO: 0.8 K/UL — LOW (ref 1–3.3)
LYMPHOCYTES # BLD AUTO: 23.3 % — SIGNIFICANT CHANGE UP (ref 13–44)
MCHC RBC-ENTMCNC: 34 GM/DL — SIGNIFICANT CHANGE UP (ref 32–36)
MCHC RBC-ENTMCNC: 34.2 PG — HIGH (ref 27–34)
MCV RBC AUTO: 100.7 FL — HIGH (ref 80–100)
MONOCYTES # BLD AUTO: 0.3 K/UL — SIGNIFICANT CHANGE UP (ref 0–0.9)
MONOCYTES NFR BLD AUTO: 8.8 % — SIGNIFICANT CHANGE UP (ref 2–14)
NEUTROPHILS # BLD AUTO: 2 K/UL — SIGNIFICANT CHANGE UP (ref 1.8–7.4)
NEUTROPHILS NFR BLD AUTO: 56.9 % — SIGNIFICANT CHANGE UP (ref 43–77)
PLATELET # BLD AUTO: 141 K/UL — LOW (ref 150–400)
RBC # BLD: 3.36 M/UL — LOW (ref 3.8–5.2)
RBC # FLD: 11 % — SIGNIFICANT CHANGE UP (ref 10.3–14.5)
WBC # BLD: 3.6 K/UL — LOW (ref 3.8–10.5)
WBC # FLD AUTO: 3.6 K/UL — LOW (ref 3.8–10.5)

## 2017-09-25 PROCEDURE — 99215 OFFICE O/P EST HI 40 MIN: CPT

## 2017-10-23 ENCOUNTER — OUTPATIENT (OUTPATIENT)
Dept: OUTPATIENT SERVICES | Facility: HOSPITAL | Age: 67
LOS: 1 days | Discharge: HOME | End: 2017-10-23

## 2017-10-23 DIAGNOSIS — Z98.890 OTHER SPECIFIED POSTPROCEDURAL STATES: Chronic | ICD-10-CM

## 2017-10-23 DIAGNOSIS — Z98.49 CATARACT EXTRACTION STATUS, UNSPECIFIED EYE: Chronic | ICD-10-CM

## 2017-10-23 DIAGNOSIS — Z90.710 ACQUIRED ABSENCE OF BOTH CERVIX AND UTERUS: Chronic | ICD-10-CM

## 2017-10-23 DIAGNOSIS — C80.1 MALIGNANT (PRIMARY) NEOPLASM, UNSPECIFIED: ICD-10-CM

## 2017-10-23 DIAGNOSIS — Z90.89 ACQUIRED ABSENCE OF OTHER ORGANS: Chronic | ICD-10-CM

## 2017-10-30 ENCOUNTER — LABORATORY RESULT (OUTPATIENT)
Age: 67
End: 2017-10-30

## 2017-10-30 ENCOUNTER — RESULT REVIEW (OUTPATIENT)
Age: 67
End: 2017-10-30

## 2017-10-30 ENCOUNTER — APPOINTMENT (OUTPATIENT)
Dept: INFUSION THERAPY | Facility: CLINIC | Age: 67
End: 2017-10-30

## 2017-10-30 LAB
BASOPHILS # BLD AUTO: 0 K/UL — SIGNIFICANT CHANGE UP (ref 0–0.2)
BASOPHILS NFR BLD AUTO: 1.1 % — SIGNIFICANT CHANGE UP (ref 0–2)
EOSINOPHIL # BLD AUTO: 0.2 K/UL — SIGNIFICANT CHANGE UP (ref 0–0.5)
EOSINOPHIL NFR BLD AUTO: 5.5 % — SIGNIFICANT CHANGE UP (ref 0–6)
HCT VFR BLD CALC: 36.1 % — SIGNIFICANT CHANGE UP (ref 34.5–45)
HGB BLD-MCNC: 12.1 G/DL — SIGNIFICANT CHANGE UP (ref 11.5–15.5)
LYMPHOCYTES # BLD AUTO: 1.1 K/UL — SIGNIFICANT CHANGE UP (ref 1–3.3)
LYMPHOCYTES # BLD AUTO: 27 % — SIGNIFICANT CHANGE UP (ref 13–44)
MCHC RBC-ENTMCNC: 33.5 GM/DL — SIGNIFICANT CHANGE UP (ref 32–36)
MCHC RBC-ENTMCNC: 33.5 PG — SIGNIFICANT CHANGE UP (ref 27–34)
MCV RBC AUTO: 100.2 FL — HIGH (ref 80–100)
MONOCYTES # BLD AUTO: 0.3 K/UL — SIGNIFICANT CHANGE UP (ref 0–0.9)
MONOCYTES NFR BLD AUTO: 8.6 % — SIGNIFICANT CHANGE UP (ref 2–14)
NEUTROPHILS # BLD AUTO: 2.3 K/UL — SIGNIFICANT CHANGE UP (ref 1.8–7.4)
NEUTROPHILS NFR BLD AUTO: 57.8 % — SIGNIFICANT CHANGE UP (ref 43–77)
PLATELET # BLD AUTO: 158 K/UL — SIGNIFICANT CHANGE UP (ref 150–400)
RBC # BLD: 3.6 M/UL — LOW (ref 3.8–5.2)
RBC # FLD: 11.7 % — SIGNIFICANT CHANGE UP (ref 10.3–14.5)
WBC # BLD: 3.9 K/UL — SIGNIFICANT CHANGE UP (ref 3.8–10.5)
WBC # FLD AUTO: 3.9 K/UL — SIGNIFICANT CHANGE UP (ref 3.8–10.5)

## 2017-11-06 NOTE — H&P PST ADULT - NSCAFFEINEWITH_GEN_ALL_CORE_SD
“You can access the Follow[facility] Patient Portal, offered by [facility], by registering with the following website: [email or web address]”
loss of energy

## 2017-11-08 ENCOUNTER — APPOINTMENT (OUTPATIENT)
Dept: COLORECTAL SURGERY | Facility: CLINIC | Age: 67
End: 2017-11-08
Payer: MEDICARE

## 2017-11-08 VITALS
TEMPERATURE: 98 F | BODY MASS INDEX: 19.26 KG/M2 | SYSTOLIC BLOOD PRESSURE: 110 MMHG | HEIGHT: 69 IN | WEIGHT: 130 LBS | DIASTOLIC BLOOD PRESSURE: 62 MMHG | RESPIRATION RATE: 16 BRPM

## 2017-11-08 PROCEDURE — 46600 DIAGNOSTIC ANOSCOPY SPX: CPT

## 2017-11-08 PROCEDURE — 99214 OFFICE O/P EST MOD 30 MIN: CPT | Mod: 25

## 2017-11-16 ENCOUNTER — OUTPATIENT (OUTPATIENT)
Dept: OUTPATIENT SERVICES | Facility: HOSPITAL | Age: 67
LOS: 1 days | End: 2017-11-16
Payer: MEDICARE

## 2017-11-16 VITALS
DIASTOLIC BLOOD PRESSURE: 70 MMHG | SYSTOLIC BLOOD PRESSURE: 102 MMHG | TEMPERATURE: 98 F | HEIGHT: 68 IN | HEART RATE: 76 BPM | WEIGHT: 125.66 LBS | RESPIRATION RATE: 16 BRPM

## 2017-11-16 DIAGNOSIS — Z98.890 OTHER SPECIFIED POSTPROCEDURAL STATES: Chronic | ICD-10-CM

## 2017-11-16 DIAGNOSIS — E78.5 HYPERLIPIDEMIA, UNSPECIFIED: ICD-10-CM

## 2017-11-16 DIAGNOSIS — Z98.49 CATARACT EXTRACTION STATUS, UNSPECIFIED EYE: Chronic | ICD-10-CM

## 2017-11-16 DIAGNOSIS — I10 ESSENTIAL (PRIMARY) HYPERTENSION: ICD-10-CM

## 2017-11-16 DIAGNOSIS — Z90.89 ACQUIRED ABSENCE OF OTHER ORGANS: Chronic | ICD-10-CM

## 2017-11-16 DIAGNOSIS — Z01.818 ENCOUNTER FOR OTHER PREPROCEDURAL EXAMINATION: ICD-10-CM

## 2017-11-16 DIAGNOSIS — Z90.710 ACQUIRED ABSENCE OF BOTH CERVIX AND UTERUS: Chronic | ICD-10-CM

## 2017-11-16 DIAGNOSIS — C44.92 SQUAMOUS CELL CARCINOMA OF SKIN, UNSPECIFIED: ICD-10-CM

## 2017-11-16 PROBLEM — Z00.00 ENCOUNTER FOR PREVENTIVE HEALTH EXAMINATION: Noted: 2017-11-16

## 2017-11-16 LAB
ANION GAP SERPL CALC-SCNC: 14 MMOL/L — SIGNIFICANT CHANGE UP (ref 5–17)
APTT BLD: 30.5 SEC — SIGNIFICANT CHANGE UP (ref 27.5–37.4)
BASOPHILS # BLD AUTO: 0 K/UL — SIGNIFICANT CHANGE UP (ref 0–0.2)
BASOPHILS NFR BLD AUTO: 0.3 % — SIGNIFICANT CHANGE UP (ref 0–2)
BLD GP AB SCN SERPL QL: SIGNIFICANT CHANGE UP
BUN SERPL-MCNC: 26 MG/DL — HIGH (ref 8–20)
CALCIUM SERPL-MCNC: 9.6 MG/DL — SIGNIFICANT CHANGE UP (ref 8.6–10.2)
CHLORIDE SERPL-SCNC: 102 MMOL/L — SIGNIFICANT CHANGE UP (ref 98–107)
CO2 SERPL-SCNC: 24 MMOL/L — SIGNIFICANT CHANGE UP (ref 22–29)
CREAT SERPL-MCNC: 0.75 MG/DL — SIGNIFICANT CHANGE UP (ref 0.5–1.3)
EOSINOPHIL # BLD AUTO: 0.2 K/UL — SIGNIFICANT CHANGE UP (ref 0–0.5)
EOSINOPHIL NFR BLD AUTO: 4.4 % — SIGNIFICANT CHANGE UP (ref 0–6)
GLUCOSE SERPL-MCNC: 96 MG/DL — SIGNIFICANT CHANGE UP (ref 70–115)
HBA1C BLD-MCNC: 5 % — SIGNIFICANT CHANGE UP (ref 4–5.6)
HCT VFR BLD CALC: 37.9 % — SIGNIFICANT CHANGE UP (ref 37–47)
HGB BLD-MCNC: 12.5 G/DL — SIGNIFICANT CHANGE UP (ref 12–16)
INR BLD: 0.95 RATIO — SIGNIFICANT CHANGE UP (ref 0.88–1.16)
LYMPHOCYTES # BLD AUTO: 0.9 K/UL — LOW (ref 1–4.8)
LYMPHOCYTES # BLD AUTO: 25.2 % — SIGNIFICANT CHANGE UP (ref 20–55)
MCHC RBC-ENTMCNC: 33 G/DL — SIGNIFICANT CHANGE UP (ref 32–36)
MCHC RBC-ENTMCNC: 33 PG — HIGH (ref 27–31)
MCV RBC AUTO: 100 FL — HIGH (ref 81–99)
MONOCYTES # BLD AUTO: 0.2 K/UL — SIGNIFICANT CHANGE UP (ref 0–0.8)
MONOCYTES NFR BLD AUTO: 6.8 % — SIGNIFICANT CHANGE UP (ref 3–10)
NEUTROPHILS # BLD AUTO: 2.3 K/UL — SIGNIFICANT CHANGE UP (ref 1.8–8)
NEUTROPHILS NFR BLD AUTO: 63.3 % — SIGNIFICANT CHANGE UP (ref 37–73)
PLATELET # BLD AUTO: 202 K/UL — SIGNIFICANT CHANGE UP (ref 150–400)
POTASSIUM SERPL-MCNC: 4.4 MMOL/L — SIGNIFICANT CHANGE UP (ref 3.5–5.3)
POTASSIUM SERPL-SCNC: 4.4 MMOL/L — SIGNIFICANT CHANGE UP (ref 3.5–5.3)
PROTHROM AB SERPL-ACNC: 10.4 SEC — SIGNIFICANT CHANGE UP (ref 9.8–12.7)
RBC # BLD: 3.79 M/UL — LOW (ref 4.4–5.2)
RBC # FLD: 12.9 % — SIGNIFICANT CHANGE UP (ref 11–15.6)
SODIUM SERPL-SCNC: 140 MMOL/L — SIGNIFICANT CHANGE UP (ref 135–145)
TYPE + AB SCN PNL BLD: SIGNIFICANT CHANGE UP
WBC # BLD: 3.6 K/UL — LOW (ref 4.8–10.8)
WBC # FLD AUTO: 3.6 K/UL — LOW (ref 4.8–10.8)

## 2017-11-16 PROCEDURE — G0463: CPT

## 2017-11-16 PROCEDURE — 85610 PROTHROMBIN TIME: CPT

## 2017-11-16 PROCEDURE — 86900 BLOOD TYPING SEROLOGIC ABO: CPT

## 2017-11-16 PROCEDURE — 85730 THROMBOPLASTIN TIME PARTIAL: CPT

## 2017-11-16 PROCEDURE — 93010 ELECTROCARDIOGRAM REPORT: CPT

## 2017-11-16 PROCEDURE — 83036 HEMOGLOBIN GLYCOSYLATED A1C: CPT

## 2017-11-16 PROCEDURE — 80048 BASIC METABOLIC PNL TOTAL CA: CPT

## 2017-11-16 PROCEDURE — 86901 BLOOD TYPING SEROLOGIC RH(D): CPT

## 2017-11-16 PROCEDURE — 93005 ELECTROCARDIOGRAM TRACING: CPT

## 2017-11-16 PROCEDURE — 36415 COLL VENOUS BLD VENIPUNCTURE: CPT

## 2017-11-16 PROCEDURE — 85027 COMPLETE CBC AUTOMATED: CPT

## 2017-11-16 PROCEDURE — 86850 RBC ANTIBODY SCREEN: CPT

## 2017-11-16 NOTE — H&P PST ADULT - ASSESSMENT
Pt is a 67 y.o female with PMH of HTN, hyperlipidemia, anal cancer undergoing a rectal exam under anesthesia, and biopsy, injection of pudendal nerve block, possible hemorrhoidectomy.

## 2017-11-16 NOTE — H&P PST ADULT - VENOUS THROMBOEMBOLISM CURRENT STATUS
varicose veins/present cancer or chemotherapy/major surgery, including arthroscopic and laparoscopic (greater than 1 hr)

## 2017-11-16 NOTE — H&P PST ADULT - NSANTHOSAYNRD_GEN_A_CORE
No. ANA MARÍA screening performed.  STOP BANG Legend: 0-2 = LOW Risk; 3-4 = INTERMEDIATE Risk; 5-8 = HIGH Risk

## 2017-11-16 NOTE — H&P PST ADULT - PROBLEM SELECTOR PLAN 1
rectal exam under anesthesia, and biopsy, injection of pudendal nerve block, possible hemorrhoidectomy.

## 2017-11-16 NOTE — H&P PST ADULT - PSH
H/O total hysterectomy    History of tonsillectomy    S/P lumbar discectomy H/O total hysterectomy    History of eye surgery  bilateral cataracts  History of tonsillectomy    S/P lumbar discectomy

## 2017-11-16 NOTE — H&P PST ADULT - PMH
Allergic rhinitis due to allergen    Anal cancer    Hyperlipidemia    Hypertension Allergic rhinitis due to allergen    Anal cancer    Cataracts, both eyes    Hyperlipidemia    Hypertension

## 2017-11-16 NOTE — H&P PST ADULT - FAMILY HISTORY
Sibling  Still living? Yes, Estimated age: 61-70  Family history of skin cancer, Age at diagnosis: 61-70     Father  Still living? No  Family history of pancreatic cancer, Age at diagnosis: 71-80  Family history of liver cancer, Age at diagnosis: 71-80     Mother  Still living? No  Family history of TIAs, Age at diagnosis: 71-80

## 2017-11-16 NOTE — H&P PST ADULT - HISTORY OF PRESENT ILLNESS
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

## 2017-11-27 ENCOUNTER — APPOINTMENT (OUTPATIENT)
Dept: CARDIOLOGY | Facility: CLINIC | Age: 67
End: 2017-11-27
Payer: MEDICARE

## 2017-11-27 ENCOUNTER — NON-APPOINTMENT (OUTPATIENT)
Age: 67
End: 2017-11-27

## 2017-11-27 ENCOUNTER — APPOINTMENT (OUTPATIENT)
Dept: FAMILY MEDICINE | Facility: CLINIC | Age: 67
End: 2017-11-27
Payer: MEDICARE

## 2017-11-27 VITALS
HEIGHT: 69 IN | HEART RATE: 68 BPM | DIASTOLIC BLOOD PRESSURE: 72 MMHG | RESPIRATION RATE: 14 BRPM | OXYGEN SATURATION: 98 % | BODY MASS INDEX: 18.66 KG/M2 | WEIGHT: 126 LBS | SYSTOLIC BLOOD PRESSURE: 140 MMHG

## 2017-11-27 VITALS
DIASTOLIC BLOOD PRESSURE: 84 MMHG | BODY MASS INDEX: 19.1 KG/M2 | HEIGHT: 68 IN | OXYGEN SATURATION: 98 % | WEIGHT: 126 LBS | HEART RATE: 65 BPM | SYSTOLIC BLOOD PRESSURE: 131 MMHG

## 2017-11-27 PROCEDURE — 93000 ELECTROCARDIOGRAM COMPLETE: CPT

## 2017-11-27 PROCEDURE — 99214 OFFICE O/P EST MOD 30 MIN: CPT

## 2017-11-30 ENCOUNTER — APPOINTMENT (OUTPATIENT)
Dept: COLORECTAL SURGERY | Facility: HOSPITAL | Age: 67
End: 2017-11-30
Payer: MEDICARE

## 2017-11-30 ENCOUNTER — TRANSCRIPTION ENCOUNTER (OUTPATIENT)
Age: 67
End: 2017-11-30

## 2017-11-30 ENCOUNTER — RESULT REVIEW (OUTPATIENT)
Age: 67
End: 2017-11-30

## 2017-11-30 ENCOUNTER — OUTPATIENT (OUTPATIENT)
Dept: OUTPATIENT SERVICES | Facility: HOSPITAL | Age: 67
LOS: 1 days | End: 2017-11-30
Payer: MEDICARE

## 2017-11-30 VITALS
OXYGEN SATURATION: 100 % | HEIGHT: 68 IN | TEMPERATURE: 97 F | RESPIRATION RATE: 16 BRPM | DIASTOLIC BLOOD PRESSURE: 55 MMHG | WEIGHT: 126.1 LBS | HEART RATE: 67 BPM | SYSTOLIC BLOOD PRESSURE: 122 MMHG

## 2017-11-30 VITALS
DIASTOLIC BLOOD PRESSURE: 54 MMHG | OXYGEN SATURATION: 100 % | RESPIRATION RATE: 22 BRPM | SYSTOLIC BLOOD PRESSURE: 150 MMHG | TEMPERATURE: 97 F | HEART RATE: 58 BPM

## 2017-11-30 DIAGNOSIS — Z90.710 ACQUIRED ABSENCE OF BOTH CERVIX AND UTERUS: Chronic | ICD-10-CM

## 2017-11-30 DIAGNOSIS — Z90.89 ACQUIRED ABSENCE OF OTHER ORGANS: Chronic | ICD-10-CM

## 2017-11-30 DIAGNOSIS — Z98.890 OTHER SPECIFIED POSTPROCEDURAL STATES: Chronic | ICD-10-CM

## 2017-11-30 DIAGNOSIS — C44.92 SQUAMOUS CELL CARCINOMA OF SKIN, UNSPECIFIED: ICD-10-CM

## 2017-11-30 DIAGNOSIS — Z01.818 ENCOUNTER FOR OTHER PREPROCEDURAL EXAMINATION: ICD-10-CM

## 2017-11-30 DIAGNOSIS — Z98.49 CATARACT EXTRACTION STATUS, UNSPECIFIED EYE: Chronic | ICD-10-CM

## 2017-11-30 LAB
BLD GP AB SCN SERPL QL: SIGNIFICANT CHANGE UP
TYPE + AB SCN PNL BLD: SIGNIFICANT CHANGE UP

## 2017-11-30 PROCEDURE — 88305 TISSUE EXAM BY PATHOLOGIST: CPT | Mod: 26

## 2017-11-30 PROCEDURE — 36415 COLL VENOUS BLD VENIPUNCTURE: CPT

## 2017-11-30 PROCEDURE — 45171 EXC RECT TUM TRANSANAL PART: CPT

## 2017-11-30 PROCEDURE — 45100 BIOPSY OF RECTUM: CPT

## 2017-11-30 PROCEDURE — 64630 INJECTION TREATMENT OF NERVE: CPT

## 2017-11-30 PROCEDURE — 86850 RBC ANTIBODY SCREEN: CPT

## 2017-11-30 PROCEDURE — 86900 BLOOD TYPING SEROLOGIC ABO: CPT

## 2017-11-30 PROCEDURE — 86901 BLOOD TYPING SEROLOGIC RH(D): CPT

## 2017-11-30 PROCEDURE — 88305 TISSUE EXAM BY PATHOLOGIST: CPT

## 2017-11-30 RX ORDER — ONDANSETRON 8 MG/1
4 TABLET, FILM COATED ORAL ONCE
Qty: 0 | Refills: 0 | Status: DISCONTINUED | OUTPATIENT
Start: 2017-11-30 | End: 2017-11-30

## 2017-11-30 RX ORDER — SODIUM CHLORIDE 9 MG/ML
1000 INJECTION, SOLUTION INTRAVENOUS
Qty: 0 | Refills: 0 | Status: DISCONTINUED | OUTPATIENT
Start: 2017-11-30 | End: 2017-11-30

## 2017-11-30 RX ORDER — SODIUM CHLORIDE 9 MG/ML
3 INJECTION INTRAMUSCULAR; INTRAVENOUS; SUBCUTANEOUS ONCE
Qty: 0 | Refills: 0 | Status: DISCONTINUED | OUTPATIENT
Start: 2017-11-30 | End: 2017-11-30

## 2017-11-30 RX ORDER — POLYETHYLENE GLYCOL 3350 17 G/17G
17 POWDER, FOR SOLUTION ORAL
Qty: 510 | Refills: 0 | OUTPATIENT
Start: 2017-11-30 | End: 2017-12-07

## 2017-11-30 RX ORDER — BUPIVACAINE 13.3 MG/ML
20 INJECTION, SUSPENSION, LIPOSOMAL INFILTRATION ONCE
Qty: 0 | Refills: 0 | Status: DISCONTINUED | OUTPATIENT
Start: 2017-11-30 | End: 2017-11-30

## 2017-11-30 RX ORDER — KETOROLAC TROMETHAMINE 30 MG/ML
1 SYRINGE (ML) INJECTION
Qty: 20 | Refills: 0 | OUTPATIENT
Start: 2017-11-30 | End: 2017-12-05

## 2017-11-30 RX ORDER — FENTANYL CITRATE 50 UG/ML
25 INJECTION INTRAVENOUS
Qty: 0 | Refills: 0 | Status: DISCONTINUED | OUTPATIENT
Start: 2017-11-30 | End: 2017-11-30

## 2017-11-30 NOTE — ASU DISCHARGE PLAN (ADULT/PEDIATRIC). - COMMENTS
Call for an appointment to be seen by Dr. Snachez in 2-3 weeks Call for an appointment to be seen by Dr. Sanchez in 2-3 weeks  [phone]

## 2017-11-30 NOTE — BRIEF OPERATIVE NOTE - OPERATION/FINDINGS
Posterior based scar, smooth on palpation and visualization, B/L hypertrophied anal papillaes posteriorly adjacent to anal scar - the right posterior one was excised

## 2017-11-30 NOTE — ASU DISCHARGE PLAN (ADULT/PEDIATRIC). - MEDICATION SUMMARY - MEDICATIONS TO TAKE
I will START or STAY ON the medications listed below when I get home from the hospital:    ketorolac 10 mg oral tablet  -- 1 tab(s) by mouth every 6 hours, As Needed -for moderate pain MDD:004   -- It is very important that you take or use this exactly as directed.  Do not skip doses or discontinue unless directed by your doctor.  May cause drowsiness or dizziness.  Obtain medical advice before taking any non-prescription drugs as some may affect the action of this medication.  Take with food or milk.    -- Indication: For postop pain    oxyCODONE-acetaminophen 5 mg-325 mg oral tablet  -- 1 tab(s) by mouth every 6 hours, As Needed -for moderate pain MDD:004   -- Caution federal law prohibits the transfer of this drug to any person other  than the person for whom it was prescribed.  May cause drowsiness.  Alcohol may intensify this effect.  Use care when operating dangerous machinery.  This prescription cannot be refilled.  This product contains acetaminophen.  Do not use  with any other product containing acetaminophen to prevent possible liver damage.  Using more of this medication than prescribed may cause serious breathing problems.    -- Indication: For postop pain    Allegra 24 Hour Allergy oral tablet  -- 1 tab(s) by mouth once a day  -- Indication: For home med    Crestor 5 mg oral tablet  -- 1 tab(s) by mouth once a day (at bedtime)  -- Indication: For home med    metoprolol succinate 50 mg oral tablet, extended release  -- 1 tab(s) by mouth once a day  -- Indication: For home med    ClearLax oral powder for reconstitution  -- 17 gram(s) by mouth once a day MDD:17g  -- Dilute this medication with liquid before administration.  It is very important that you take or use this exactly as directed.  Do not skip doses or discontinue unless directed by your doctor.    -- Indication: For to avoid constipation while on percocet    Vitamin D3 1000 intl units oral capsule  -- 1 cap(s) by mouth once a day  -- Indication: For home med

## 2017-11-30 NOTE — BRIEF OPERATIVE NOTE - PROCEDURE
<<-----Click on this checkbox to enter Procedure Pudendal nerve block  11/30/2017    Active  SSHIH1  Anal biopsy  11/30/2017    Active  SSHIH1  Exam under anesthesia, anorectal  11/30/2017    Active  SSHIH1

## 2017-12-05 ENCOUNTER — OUTPATIENT (OUTPATIENT)
Dept: OUTPATIENT SERVICES | Facility: HOSPITAL | Age: 67
LOS: 1 days | Discharge: HOME | End: 2017-12-05

## 2017-12-05 DIAGNOSIS — Z90.710 ACQUIRED ABSENCE OF BOTH CERVIX AND UTERUS: Chronic | ICD-10-CM

## 2017-12-05 DIAGNOSIS — Z98.890 OTHER SPECIFIED POSTPROCEDURAL STATES: Chronic | ICD-10-CM

## 2017-12-05 DIAGNOSIS — Z90.89 ACQUIRED ABSENCE OF OTHER ORGANS: Chronic | ICD-10-CM

## 2017-12-05 DIAGNOSIS — Z98.49 CATARACT EXTRACTION STATUS, UNSPECIFIED EYE: Chronic | ICD-10-CM

## 2017-12-05 DIAGNOSIS — C80.1 MALIGNANT (PRIMARY) NEOPLASM, UNSPECIFIED: ICD-10-CM

## 2017-12-07 ENCOUNTER — APPOINTMENT (OUTPATIENT)
Dept: HEMATOLOGY ONCOLOGY | Facility: CLINIC | Age: 67
End: 2017-12-07

## 2017-12-11 ENCOUNTER — APPOINTMENT (OUTPATIENT)
Dept: HEMATOLOGY ONCOLOGY | Facility: CLINIC | Age: 67
End: 2017-12-11

## 2017-12-14 ENCOUNTER — RESULT REVIEW (OUTPATIENT)
Age: 67
End: 2017-12-14

## 2017-12-14 ENCOUNTER — APPOINTMENT (OUTPATIENT)
Dept: HEMATOLOGY ONCOLOGY | Facility: CLINIC | Age: 67
End: 2017-12-14
Payer: MEDICARE

## 2017-12-14 ENCOUNTER — APPOINTMENT (OUTPATIENT)
Dept: INFUSION THERAPY | Facility: CLINIC | Age: 67
End: 2017-12-14

## 2017-12-14 ENCOUNTER — LABORATORY RESULT (OUTPATIENT)
Age: 67
End: 2017-12-14

## 2017-12-14 VITALS
HEART RATE: 72 BPM | SYSTOLIC BLOOD PRESSURE: 147 MMHG | BODY MASS INDEX: 19.61 KG/M2 | DIASTOLIC BLOOD PRESSURE: 74 MMHG | OXYGEN SATURATION: 97 % | WEIGHT: 128.97 LBS | TEMPERATURE: 97.6 F

## 2017-12-14 LAB
BASOPHILS # BLD AUTO: 0 K/UL — SIGNIFICANT CHANGE UP (ref 0–0.2)
BASOPHILS NFR BLD AUTO: 0.8 % — SIGNIFICANT CHANGE UP (ref 0–2)
EOSINOPHIL # BLD AUTO: 0.1 K/UL — SIGNIFICANT CHANGE UP (ref 0–0.5)
EOSINOPHIL NFR BLD AUTO: 2.8 % — SIGNIFICANT CHANGE UP (ref 0–6)
HCT VFR BLD CALC: 37 % — SIGNIFICANT CHANGE UP (ref 34.5–45)
HGB BLD-MCNC: 12 G/DL — SIGNIFICANT CHANGE UP (ref 11.5–15.5)
LYMPHOCYTES # BLD AUTO: 1 K/UL — SIGNIFICANT CHANGE UP (ref 1–3.3)
LYMPHOCYTES # BLD AUTO: 20.4 % — SIGNIFICANT CHANGE UP (ref 13–44)
MCHC RBC-ENTMCNC: 32.6 GM/DL — SIGNIFICANT CHANGE UP (ref 32–36)
MCHC RBC-ENTMCNC: 32.8 PG — SIGNIFICANT CHANGE UP (ref 27–34)
MCV RBC AUTO: 100.7 FL — HIGH (ref 80–100)
MONOCYTES # BLD AUTO: 0.3 K/UL — SIGNIFICANT CHANGE UP (ref 0–0.9)
MONOCYTES NFR BLD AUTO: 5.1 % — SIGNIFICANT CHANGE UP (ref 2–14)
NEUTROPHILS # BLD AUTO: 3.6 K/UL — SIGNIFICANT CHANGE UP (ref 1.8–7.4)
NEUTROPHILS NFR BLD AUTO: 70.9 % — SIGNIFICANT CHANGE UP (ref 43–77)
PLATELET # BLD AUTO: 197 K/UL — SIGNIFICANT CHANGE UP (ref 150–400)
RBC # BLD: 3.67 M/UL — LOW (ref 3.8–5.2)
RBC # FLD: 12 % — SIGNIFICANT CHANGE UP (ref 10.3–14.5)
WBC # BLD: 5.1 K/UL — SIGNIFICANT CHANGE UP (ref 3.8–10.5)
WBC # FLD AUTO: 5.1 K/UL — SIGNIFICANT CHANGE UP (ref 3.8–10.5)

## 2017-12-14 PROCEDURE — 99215 OFFICE O/P EST HI 40 MIN: CPT

## 2017-12-15 LAB
ALBUMIN SERPL ELPH-MCNC: 4 G/DL
ALP BLD-CCNC: 60 U/L
ALT SERPL-CCNC: 9 U/L
ANION GAP SERPL CALC-SCNC: 15 MMOL/L
AST SERPL-CCNC: 16 U/L
BILIRUB SERPL-MCNC: 0.2 MG/DL
BUN SERPL-MCNC: 21 MG/DL
CALCIUM SERPL-MCNC: 9.3 MG/DL
CEA SERPL-MCNC: 1.9 NG/ML
CHLORIDE SERPL-SCNC: 102 MMOL/L
CO2 SERPL-SCNC: 23 MMOL/L
CREAT SERPL-MCNC: 0.75 MG/DL
GLUCOSE SERPL-MCNC: 94 MG/DL
MAGNESIUM SERPL-MCNC: 2.2 MG/DL
POTASSIUM SERPL-SCNC: 4.5 MMOL/L
PROT SERPL-MCNC: 6.8 G/DL
SODIUM SERPL-SCNC: 140 MMOL/L

## 2018-01-09 ENCOUNTER — OUTPATIENT (OUTPATIENT)
Dept: OUTPATIENT SERVICES | Facility: HOSPITAL | Age: 68
LOS: 1 days | Discharge: HOME | End: 2018-01-09

## 2018-01-09 DIAGNOSIS — Z98.890 OTHER SPECIFIED POSTPROCEDURAL STATES: Chronic | ICD-10-CM

## 2018-01-09 DIAGNOSIS — Z90.710 ACQUIRED ABSENCE OF BOTH CERVIX AND UTERUS: Chronic | ICD-10-CM

## 2018-01-09 DIAGNOSIS — C80.1 MALIGNANT (PRIMARY) NEOPLASM, UNSPECIFIED: ICD-10-CM

## 2018-01-09 DIAGNOSIS — Z90.89 ACQUIRED ABSENCE OF OTHER ORGANS: Chronic | ICD-10-CM

## 2018-01-09 DIAGNOSIS — Z98.49 CATARACT EXTRACTION STATUS, UNSPECIFIED EYE: Chronic | ICD-10-CM

## 2018-01-11 ENCOUNTER — APPOINTMENT (OUTPATIENT)
Dept: CT IMAGING | Facility: CLINIC | Age: 68
End: 2018-01-11
Payer: MEDICARE

## 2018-01-11 ENCOUNTER — LABORATORY RESULT (OUTPATIENT)
Age: 68
End: 2018-01-11

## 2018-01-11 ENCOUNTER — APPOINTMENT (OUTPATIENT)
Dept: INFUSION THERAPY | Facility: CLINIC | Age: 68
End: 2018-01-11

## 2018-01-11 ENCOUNTER — OUTPATIENT (OUTPATIENT)
Dept: OUTPATIENT SERVICES | Facility: HOSPITAL | Age: 68
LOS: 1 days | End: 2018-01-11
Payer: MEDICARE

## 2018-01-11 DIAGNOSIS — Z98.890 OTHER SPECIFIED POSTPROCEDURAL STATES: Chronic | ICD-10-CM

## 2018-01-11 DIAGNOSIS — Z90.89 ACQUIRED ABSENCE OF OTHER ORGANS: Chronic | ICD-10-CM

## 2018-01-11 DIAGNOSIS — Z98.49 CATARACT EXTRACTION STATUS, UNSPECIFIED EYE: Chronic | ICD-10-CM

## 2018-01-11 DIAGNOSIS — Z90.710 ACQUIRED ABSENCE OF BOTH CERVIX AND UTERUS: Chronic | ICD-10-CM

## 2018-01-11 DIAGNOSIS — C21.0 MALIGNANT NEOPLASM OF ANUS, UNSPECIFIED: ICD-10-CM

## 2018-01-11 PROCEDURE — 74177 CT ABD & PELVIS W/CONTRAST: CPT | Mod: 26

## 2018-01-11 PROCEDURE — 74177 CT ABD & PELVIS W/CONTRAST: CPT

## 2018-01-11 PROCEDURE — 71260 CT THORAX DX C+: CPT | Mod: 26

## 2018-01-11 PROCEDURE — 82565 ASSAY OF CREATININE: CPT

## 2018-01-11 PROCEDURE — 71260 CT THORAX DX C+: CPT

## 2018-01-12 DIAGNOSIS — C21.0 MALIGNANT NEOPLASM OF ANUS, UNSPECIFIED: ICD-10-CM

## 2018-01-17 ENCOUNTER — APPOINTMENT (OUTPATIENT)
Dept: HEMATOLOGY ONCOLOGY | Facility: CLINIC | Age: 68
End: 2018-01-17
Payer: MEDICARE

## 2018-01-17 VITALS
BODY MASS INDEX: 19.48 KG/M2 | WEIGHT: 128.09 LBS | TEMPERATURE: 98.1 F | HEART RATE: 61 BPM | DIASTOLIC BLOOD PRESSURE: 66 MMHG | SYSTOLIC BLOOD PRESSURE: 149 MMHG | OXYGEN SATURATION: 100 %

## 2018-01-17 DIAGNOSIS — R91.1 SOLITARY PULMONARY NODULE: ICD-10-CM

## 2018-01-17 PROCEDURE — 99215 OFFICE O/P EST HI 40 MIN: CPT

## 2018-01-22 ENCOUNTER — MESSAGE (OUTPATIENT)
Age: 68
End: 2018-01-22

## 2018-01-26 ENCOUNTER — OTHER (OUTPATIENT)
Age: 68
End: 2018-01-26

## 2018-01-26 ENCOUNTER — RX RENEWAL (OUTPATIENT)
Age: 68
End: 2018-01-26

## 2018-02-15 ENCOUNTER — OUTPATIENT (OUTPATIENT)
Dept: OUTPATIENT SERVICES | Facility: HOSPITAL | Age: 68
LOS: 1 days | Discharge: HOME | End: 2018-02-15

## 2018-02-15 DIAGNOSIS — Z98.49 CATARACT EXTRACTION STATUS, UNSPECIFIED EYE: Chronic | ICD-10-CM

## 2018-02-15 DIAGNOSIS — Z98.890 OTHER SPECIFIED POSTPROCEDURAL STATES: Chronic | ICD-10-CM

## 2018-02-15 DIAGNOSIS — C21.0 MALIGNANT NEOPLASM OF ANUS, UNSPECIFIED: ICD-10-CM

## 2018-02-15 DIAGNOSIS — Z90.89 ACQUIRED ABSENCE OF OTHER ORGANS: Chronic | ICD-10-CM

## 2018-02-15 DIAGNOSIS — Z90.710 ACQUIRED ABSENCE OF BOTH CERVIX AND UTERUS: Chronic | ICD-10-CM

## 2018-02-21 ENCOUNTER — APPOINTMENT (OUTPATIENT)
Dept: INFUSION THERAPY | Facility: CLINIC | Age: 68
End: 2018-02-21

## 2018-03-12 ENCOUNTER — APPOINTMENT (OUTPATIENT)
Dept: HEMATOLOGY ONCOLOGY | Facility: CLINIC | Age: 68
End: 2018-03-12

## 2018-03-14 ENCOUNTER — APPOINTMENT (OUTPATIENT)
Dept: COLORECTAL SURGERY | Facility: CLINIC | Age: 68
End: 2018-03-14
Payer: MEDICARE

## 2018-03-14 PROCEDURE — G0105: CPT

## 2018-04-02 ENCOUNTER — FORM ENCOUNTER (OUTPATIENT)
Age: 68
End: 2018-04-02

## 2018-04-03 ENCOUNTER — OUTPATIENT (OUTPATIENT)
Dept: OUTPATIENT SERVICES | Facility: HOSPITAL | Age: 68
LOS: 1 days | End: 2018-04-03
Payer: MEDICARE

## 2018-04-03 ENCOUNTER — APPOINTMENT (OUTPATIENT)
Dept: CT IMAGING | Facility: CLINIC | Age: 68
End: 2018-04-03
Payer: MEDICARE

## 2018-04-03 DIAGNOSIS — Z90.710 ACQUIRED ABSENCE OF BOTH CERVIX AND UTERUS: Chronic | ICD-10-CM

## 2018-04-03 DIAGNOSIS — Z90.89 ACQUIRED ABSENCE OF OTHER ORGANS: Chronic | ICD-10-CM

## 2018-04-03 DIAGNOSIS — Z98.890 OTHER SPECIFIED POSTPROCEDURAL STATES: Chronic | ICD-10-CM

## 2018-04-03 DIAGNOSIS — Z98.49 CATARACT EXTRACTION STATUS, UNSPECIFIED EYE: Chronic | ICD-10-CM

## 2018-04-03 DIAGNOSIS — R91.1 SOLITARY PULMONARY NODULE: ICD-10-CM

## 2018-04-03 PROCEDURE — 71260 CT THORAX DX C+: CPT | Mod: 26

## 2018-04-03 PROCEDURE — 71260 CT THORAX DX C+: CPT

## 2018-04-05 ENCOUNTER — OUTPATIENT (OUTPATIENT)
Dept: OUTPATIENT SERVICES | Facility: HOSPITAL | Age: 68
LOS: 1 days | Discharge: HOME | End: 2018-04-05

## 2018-04-05 DIAGNOSIS — Z90.89 ACQUIRED ABSENCE OF OTHER ORGANS: Chronic | ICD-10-CM

## 2018-04-05 DIAGNOSIS — Z98.890 OTHER SPECIFIED POSTPROCEDURAL STATES: Chronic | ICD-10-CM

## 2018-04-05 DIAGNOSIS — C21.0 MALIGNANT NEOPLASM OF ANUS, UNSPECIFIED: ICD-10-CM

## 2018-04-05 DIAGNOSIS — Z90.710 ACQUIRED ABSENCE OF BOTH CERVIX AND UTERUS: Chronic | ICD-10-CM

## 2018-04-05 DIAGNOSIS — Z98.49 CATARACT EXTRACTION STATUS, UNSPECIFIED EYE: Chronic | ICD-10-CM

## 2018-04-09 ENCOUNTER — APPOINTMENT (OUTPATIENT)
Dept: INFUSION THERAPY | Facility: CLINIC | Age: 68
End: 2018-04-09

## 2018-04-21 ENCOUNTER — RX RENEWAL (OUTPATIENT)
Age: 68
End: 2018-04-21

## 2018-04-24 ENCOUNTER — RX RENEWAL (OUTPATIENT)
Age: 68
End: 2018-04-24

## 2018-04-25 ENCOUNTER — LABORATORY RESULT (OUTPATIENT)
Age: 68
End: 2018-04-25

## 2018-04-25 ENCOUNTER — RESULT REVIEW (OUTPATIENT)
Age: 68
End: 2018-04-25

## 2018-04-25 ENCOUNTER — APPOINTMENT (OUTPATIENT)
Dept: HEMATOLOGY ONCOLOGY | Facility: CLINIC | Age: 68
End: 2018-04-25
Payer: MEDICARE

## 2018-04-25 ENCOUNTER — APPOINTMENT (OUTPATIENT)
Dept: INFUSION THERAPY | Facility: CLINIC | Age: 68
End: 2018-04-25

## 2018-04-25 VITALS
TEMPERATURE: 98.1 F | OXYGEN SATURATION: 99 % | WEIGHT: 137.24 LBS | BODY MASS INDEX: 20.8 KG/M2 | HEART RATE: 59 BPM | SYSTOLIC BLOOD PRESSURE: 159 MMHG | HEIGHT: 68 IN | DIASTOLIC BLOOD PRESSURE: 74 MMHG

## 2018-04-25 LAB
BASOPHILS # BLD AUTO: 0.1 K/UL — SIGNIFICANT CHANGE UP (ref 0–0.2)
BASOPHILS NFR BLD AUTO: 1.5 % — SIGNIFICANT CHANGE UP (ref 0–2)
EOSINOPHIL # BLD AUTO: 0.3 K/UL — SIGNIFICANT CHANGE UP (ref 0–0.5)
EOSINOPHIL NFR BLD AUTO: 8 % — HIGH (ref 0–6)
HCT VFR BLD CALC: 38.1 % — SIGNIFICANT CHANGE UP (ref 34.5–45)
HGB BLD-MCNC: 12.6 G/DL — SIGNIFICANT CHANGE UP (ref 11.5–15.5)
LYMPHOCYTES # BLD AUTO: 1 K/UL — SIGNIFICANT CHANGE UP (ref 1–3.3)
LYMPHOCYTES # BLD AUTO: 26.5 % — SIGNIFICANT CHANGE UP (ref 13–44)
MCHC RBC-ENTMCNC: 33 GM/DL — SIGNIFICANT CHANGE UP (ref 32–36)
MCHC RBC-ENTMCNC: 33.1 PG — SIGNIFICANT CHANGE UP (ref 27–34)
MCV RBC AUTO: 100.4 FL — HIGH (ref 80–100)
MONOCYTES # BLD AUTO: 0.3 K/UL — SIGNIFICANT CHANGE UP (ref 0–0.9)
MONOCYTES NFR BLD AUTO: 7.1 % — SIGNIFICANT CHANGE UP (ref 2–14)
NEUTROPHILS # BLD AUTO: 2.2 K/UL — SIGNIFICANT CHANGE UP (ref 1.8–7.4)
NEUTROPHILS NFR BLD AUTO: 56.9 % — SIGNIFICANT CHANGE UP (ref 43–77)
PLATELET # BLD AUTO: 179 K/UL — SIGNIFICANT CHANGE UP (ref 150–400)
RBC # BLD: 3.79 M/UL — LOW (ref 3.8–5.2)
RBC # FLD: 12.1 % — SIGNIFICANT CHANGE UP (ref 10.3–14.5)
WBC # BLD: 3.8 K/UL — SIGNIFICANT CHANGE UP (ref 3.8–10.5)
WBC # FLD AUTO: 3.8 K/UL — SIGNIFICANT CHANGE UP (ref 3.8–10.5)

## 2018-04-25 PROCEDURE — 99215 OFFICE O/P EST HI 40 MIN: CPT

## 2018-06-11 ENCOUNTER — OUTPATIENT (OUTPATIENT)
Dept: OUTPATIENT SERVICES | Facility: HOSPITAL | Age: 68
LOS: 1 days | End: 2018-06-11
Payer: MEDICARE

## 2018-06-11 VITALS
TEMPERATURE: 97 F | WEIGHT: 138.89 LBS | HEART RATE: 59 BPM | DIASTOLIC BLOOD PRESSURE: 66 MMHG | RESPIRATION RATE: 16 BRPM | HEIGHT: 68 IN | SYSTOLIC BLOOD PRESSURE: 126 MMHG

## 2018-06-11 DIAGNOSIS — Z98.49 CATARACT EXTRACTION STATUS, UNSPECIFIED EYE: Chronic | ICD-10-CM

## 2018-06-11 DIAGNOSIS — Z98.890 OTHER SPECIFIED POSTPROCEDURAL STATES: Chronic | ICD-10-CM

## 2018-06-11 DIAGNOSIS — E78.00 PURE HYPERCHOLESTEROLEMIA, UNSPECIFIED: ICD-10-CM

## 2018-06-11 DIAGNOSIS — Z01.818 ENCOUNTER FOR OTHER PREPROCEDURAL EXAMINATION: ICD-10-CM

## 2018-06-11 DIAGNOSIS — Z29.9 ENCOUNTER FOR PROPHYLACTIC MEASURES, UNSPECIFIED: ICD-10-CM

## 2018-06-11 DIAGNOSIS — Z90.710 ACQUIRED ABSENCE OF BOTH CERVIX AND UTERUS: Chronic | ICD-10-CM

## 2018-06-11 DIAGNOSIS — I10 ESSENTIAL (PRIMARY) HYPERTENSION: ICD-10-CM

## 2018-06-11 DIAGNOSIS — Z90.89 ACQUIRED ABSENCE OF OTHER ORGANS: Chronic | ICD-10-CM

## 2018-06-11 DIAGNOSIS — C21.0 MALIGNANT NEOPLASM OF ANUS, UNSPECIFIED: ICD-10-CM

## 2018-06-11 LAB
ANION GAP SERPL CALC-SCNC: 13 MMOL/L — SIGNIFICANT CHANGE UP (ref 5–17)
APTT BLD: 28.7 SEC — SIGNIFICANT CHANGE UP (ref 27.5–37.4)
BUN SERPL-MCNC: 22 MG/DL — HIGH (ref 8–20)
CALCIUM SERPL-MCNC: 9.9 MG/DL — SIGNIFICANT CHANGE UP (ref 8.6–10.2)
CHLORIDE SERPL-SCNC: 101 MMOL/L — SIGNIFICANT CHANGE UP (ref 98–107)
CO2 SERPL-SCNC: 27 MMOL/L — SIGNIFICANT CHANGE UP (ref 22–29)
CREAT SERPL-MCNC: 0.74 MG/DL — SIGNIFICANT CHANGE UP (ref 0.5–1.3)
GLUCOSE SERPL-MCNC: 95 MG/DL — SIGNIFICANT CHANGE UP (ref 70–115)
HCT VFR BLD CALC: 42.7 % — SIGNIFICANT CHANGE UP (ref 37–47)
HGB BLD-MCNC: 13.6 G/DL — SIGNIFICANT CHANGE UP (ref 12–16)
INR BLD: 0.92 RATIO — SIGNIFICANT CHANGE UP (ref 0.88–1.16)
MCHC RBC-ENTMCNC: 31.9 G/DL — LOW (ref 32–36)
MCHC RBC-ENTMCNC: 32.3 PG — HIGH (ref 27–31)
MCV RBC AUTO: 101.4 FL — HIGH (ref 81–99)
PLATELET # BLD AUTO: 181 K/UL — SIGNIFICANT CHANGE UP (ref 150–400)
POTASSIUM SERPL-MCNC: 5.4 MMOL/L — HIGH (ref 3.5–5.3)
POTASSIUM SERPL-SCNC: 5.4 MMOL/L — HIGH (ref 3.5–5.3)
PROTHROM AB SERPL-ACNC: 10.1 SEC — SIGNIFICANT CHANGE UP (ref 9.8–12.7)
RBC # BLD: 4.21 M/UL — LOW (ref 4.4–5.2)
RBC # FLD: 12.7 % — SIGNIFICANT CHANGE UP (ref 11–15.6)
SODIUM SERPL-SCNC: 141 MMOL/L — SIGNIFICANT CHANGE UP (ref 135–145)
WBC # BLD: 3.5 K/UL — LOW (ref 4.8–10.8)
WBC # FLD AUTO: 3.5 K/UL — LOW (ref 4.8–10.8)

## 2018-06-11 PROCEDURE — G0463: CPT

## 2018-06-11 PROCEDURE — 85730 THROMBOPLASTIN TIME PARTIAL: CPT

## 2018-06-11 PROCEDURE — 36415 COLL VENOUS BLD VENIPUNCTURE: CPT

## 2018-06-11 PROCEDURE — 93005 ELECTROCARDIOGRAM TRACING: CPT

## 2018-06-11 PROCEDURE — 85027 COMPLETE CBC AUTOMATED: CPT

## 2018-06-11 PROCEDURE — 80048 BASIC METABOLIC PNL TOTAL CA: CPT

## 2018-06-11 PROCEDURE — 85610 PROTHROMBIN TIME: CPT

## 2018-06-11 PROCEDURE — 93010 ELECTROCARDIOGRAM REPORT: CPT

## 2018-06-11 RX ORDER — ROSUVASTATIN CALCIUM 5 MG/1
5 TABLET ORAL
Qty: 0 | Refills: 0 | COMMUNITY

## 2018-06-11 RX ORDER — CHOLECALCIFEROL (VITAMIN D3) 125 MCG
1 CAPSULE ORAL
Qty: 0 | Refills: 0 | COMMUNITY

## 2018-06-11 RX ORDER — METOPROLOL TARTRATE 50 MG
0 TABLET ORAL
Qty: 0 | Refills: 0 | COMMUNITY

## 2018-06-11 RX ORDER — SODIUM CHLORIDE 9 MG/ML
3 INJECTION INTRAMUSCULAR; INTRAVENOUS; SUBCUTANEOUS ONCE
Qty: 0 | Refills: 0 | Status: DISCONTINUED | OUTPATIENT
Start: 2018-06-14 | End: 2018-06-29

## 2018-06-11 RX ORDER — ROSUVASTATIN CALCIUM 5 MG/1
1 TABLET ORAL
Qty: 0 | Refills: 0 | COMMUNITY

## 2018-06-11 RX ORDER — ESTROGENS, CONJUGATED 0.625 MG
1 TABLET ORAL
Qty: 0 | Refills: 0 | COMMUNITY

## 2018-06-11 RX ORDER — FEXOFENADINE HCL 30 MG
1 TABLET ORAL
Qty: 0 | Refills: 0 | COMMUNITY

## 2018-06-11 NOTE — ASU PATIENT PROFILE, ADULT - LEARNING ASSESSMENT (PATIENT) ADDITIONAL COMMENTS
Instructed pt on pre-op instructions, tips for safer surgery, pain management scale, pre-surgical infection prevention instructions, take Metoprolol with a sip of water the morning of surgery, bring glasses and wear hearing aids day of surgery and verbalized understanding of all.

## 2018-06-11 NOTE — ASU PATIENT PROFILE, ADULT - VISION (WITH CORRECTIVE LENSES IF THE PATIENT USUALLY WEARS THEM):
glasses - reading/Partially impaired: cannot see medication labels or newsprint, but can see obstacles in path, and the surrounding layout; can count fingers at arm's length

## 2018-06-11 NOTE — ASU PATIENT PROFILE, ADULT - ABILITY TO HEAR (WITH HEARING AID OR HEARING APPLIANCE IF NORMALLY USED):
Parveen
Mildly to Moderately Impaired: difficulty hearing in some environments or speaker may need to increase volume or speak distinctly/bilat. hearing aids

## 2018-06-11 NOTE — ASU PATIENT PROFILE, ADULT - PSH
History of back surgery  s/p discectomy 1973  History of eye surgery  bilateral cataracts  History of tonsillectomy    S/P cataract surgery  B/L 2015  S/P colonoscopy  Baseline 2006  S/P lumbar discectomy    S/P DANDRE (total abdominal hysterectomy)  1996  S/P tonsillectomy and adenoidectomy  childhood

## 2018-06-11 NOTE — H&P PST ADULT - VENOUS THROMBOEMBOLISM CURRENT STATUS
major surgery, including arthroscopic and laparoscopic (greater than 1 hr)/present cancer or chemotherapy/varicose veins

## 2018-06-11 NOTE — H&P PST ADULT - PMH
Allergic rhinitis due to allergen    Anal cancer    Cataracts, both eyes    Hyperlipidemia    Hypertension

## 2018-06-11 NOTE — H&P PST ADULT - ANESTHESIA, PREVIOUS REACTION, PROFILE
irregular heartbeat/Afib reaction to electrolyte imbalance with colonoscopy prep/irregular heartbeat

## 2018-06-11 NOTE — H&P PST ADULT - PSH
H/O total hysterectomy    History of eye surgery  bilateral cataracts  History of tonsillectomy    S/P lumbar discectomy History of back surgery  s/p discectomy 1973  History of eye surgery  bilateral cataracts  History of tonsillectomy    S/P cataract surgery  B/L 2015  S/P colonoscopy  Baseline 2006  S/P lumbar discectomy    S/P DANDRE (total abdominal hysterectomy)  1996  S/P tonsillectomy and adenoidectomy  childhood

## 2018-06-11 NOTE — H&P PST ADULT - HISTORY OF PRESENT ILLNESS
67 year old female who has a history of 67 year old female who has a history of anal cancer S/P chemo therapy now scheduled for port removal

## 2018-06-11 NOTE — H&P PST ADULT - ASSESSMENT
67 year old female medications reviewed, instructions given on what medications to take and what not to take. Asked the patient to take the Blood pressure medication/ heart medication on DOP.   CAPRINI SCORE [CLOT]    AGE RELATED RISK FACTORS                                                       MOBILITY RELATED FACTORS  [ ] Age 41-60 years                                            (1 Point)                  [ ] Bed rest                                                        (1 Point)  [x ] Age: 61-74 years                                           (2 Points)                 [ ] Plaster cast                                                   (2 Points)  [ ] Age= 75 years                                              (3 Points)                 [ ] Bed bound for more than 72 hours                 (2 Points)    DISEASE RELATED RISK FACTORS                                               GENDER SPECIFIC FACTORS  [ ] Edema in the lower extremities                       (1 Point)                  [ ] Pregnancy                                                     (1 Point)  [ ] Varicose veins                                               (1 Point)                  [ ] Post-partum < 6 weeks                                   (1 Point)             [ ] BMI > 25 Kg/m2                                            (1 Point)                  [ ] Hormonal therapy  or oral contraception          (1 Point)                 [ ] Sepsis (in the previous month)                        (1 Point)                  [ ] History of pregnancy complications                 (1 point)  [ ] Pneumonia or serious lung disease                                               [ ] Unexplained or recurrent                     (1 Point)           (in the previous month)                               (1 Point)  [ ] Abnormal pulmonary function test                     (1 Point)                 SURGERY RELATED RISK FACTORS  [ ] Acute myocardial infarction                              (1 Point)                 [ ]  Section                                             (1 Point)  [ ] Congestive heart failure (in the previous month)  (1 Point)               [x ] Minor surgery                                                  (1 Point)   [ ] Inflammatory bowel disease                             (1 Point)                 [ ] Arthroscopic surgery                                        (2 Points)  [ ] Central venous access                                      (2 Points)                [ ] General surgery lasting more than 45 minutes   (2 Points)       [ ] Stroke (in the previous month)                          (5 Points)               [ ] Elective arthroplasty                                         (5 Points)                                                                                                                                               HEMATOLOGY RELATED FACTORS                                                 TRAUMA RELATED RISK FACTORS  [ ] Prior episodes of VTE                                     (3 Points)                 [ ] Fracture of the hip, pelvis, or leg                       (5 Points)  [ ] Positive family history for VTE                         (3 Points)                 [ ] Acute spinal cord injury (in the previous month)  (5 Points)  [ ] Prothrombin 77131 A                                     (3 Points)                 [ ] Paralysis  (less than 1 month)                             (5 Points)  [ ] Factor V Leiden                                             (3 Points)                  [ ] Multiple Trauma within 1 month                        (5 Points)  [ ] Lupus anticoagulants                                     (3 Points)                                                           [ ] Anticardiolipin antibodies                               (3 Points)                                                       [ ] High homocysteine in the blood                      (3 Points)                                             [ ] Other congenital or acquired thrombophilia      (3 Points)                                                [ ] Heparin induced thrombocytopenia                  (3 Points)                                          Total Score [   3       ]

## 2018-06-14 ENCOUNTER — OUTPATIENT (OUTPATIENT)
Dept: OUTPATIENT SERVICES | Facility: HOSPITAL | Age: 68
LOS: 1 days | End: 2018-06-14
Payer: MEDICARE

## 2018-06-14 ENCOUNTER — RESULT REVIEW (OUTPATIENT)
Age: 68
End: 2018-06-14

## 2018-06-14 VITALS
RESPIRATION RATE: 14 BRPM | HEART RATE: 60 BPM | WEIGHT: 138.89 LBS | DIASTOLIC BLOOD PRESSURE: 44 MMHG | HEIGHT: 68 IN | TEMPERATURE: 97 F | SYSTOLIC BLOOD PRESSURE: 130 MMHG

## 2018-06-14 VITALS
HEART RATE: 67 BPM | RESPIRATION RATE: 12 BRPM | SYSTOLIC BLOOD PRESSURE: 133 MMHG | TEMPERATURE: 97 F | OXYGEN SATURATION: 100 % | DIASTOLIC BLOOD PRESSURE: 60 MMHG

## 2018-06-14 DIAGNOSIS — Z98.890 OTHER SPECIFIED POSTPROCEDURAL STATES: Chronic | ICD-10-CM

## 2018-06-14 DIAGNOSIS — Z90.710 ACQUIRED ABSENCE OF BOTH CERVIX AND UTERUS: Chronic | ICD-10-CM

## 2018-06-14 DIAGNOSIS — Z90.89 ACQUIRED ABSENCE OF OTHER ORGANS: Chronic | ICD-10-CM

## 2018-06-14 DIAGNOSIS — C21.0 MALIGNANT NEOPLASM OF ANUS, UNSPECIFIED: ICD-10-CM

## 2018-06-14 DIAGNOSIS — Z98.49 CATARACT EXTRACTION STATUS, UNSPECIFIED EYE: Chronic | ICD-10-CM

## 2018-06-14 PROCEDURE — 88300 SURGICAL PATH GROSS: CPT | Mod: 26

## 2018-06-14 PROCEDURE — 36590 REMOVAL TUNNELED CV CATH: CPT

## 2018-06-14 NOTE — ASU DISCHARGE PLAN (ADULT/PEDIATRIC). - MEDICATION SUMMARY - MEDICATIONS TO TAKE
I will START or STAY ON the medications listed below when I get home from the hospital:    Allegra 24 Hour Allergy oral tablet  -- 1 tab(s) by mouth once a day  -- Indication: For per pmd    atorvastatin 10 mg oral tablet  -- 1 tab(s) by mouth once a day  -- Indication: For per pmd    metoprolol succinate 50 mg oral tablet, extended release  -- 1 tab(s) by mouth once a day  -- Indication: For per pmd    Flonase 50 mcg/inh nasal spray  -- 1 spray(s) into nose once a day  -- Indication: For per pmd

## 2018-06-14 NOTE — BRIEF OPERATIVE NOTE - PROCEDURE
<<-----Click on this checkbox to enter Procedure Catheter removal, tunneled central venous, with port  06/14/2018    Active  HALPER

## 2018-06-19 LAB — SURGICAL PATHOLOGY FINAL REPORT - CH: SIGNIFICANT CHANGE UP

## 2018-07-17 ENCOUNTER — OUTPATIENT (OUTPATIENT)
Dept: OUTPATIENT SERVICES | Facility: HOSPITAL | Age: 68
LOS: 1 days | Discharge: HOME | End: 2018-07-17

## 2018-07-17 DIAGNOSIS — Z90.89 ACQUIRED ABSENCE OF OTHER ORGANS: Chronic | ICD-10-CM

## 2018-07-17 DIAGNOSIS — Z98.49 CATARACT EXTRACTION STATUS, UNSPECIFIED EYE: Chronic | ICD-10-CM

## 2018-07-17 DIAGNOSIS — Z98.890 OTHER SPECIFIED POSTPROCEDURAL STATES: Chronic | ICD-10-CM

## 2018-07-17 DIAGNOSIS — Z90.710 ACQUIRED ABSENCE OF BOTH CERVIX AND UTERUS: Chronic | ICD-10-CM

## 2018-07-17 DIAGNOSIS — C21.0 MALIGNANT NEOPLASM OF ANUS, UNSPECIFIED: ICD-10-CM

## 2018-07-18 PROBLEM — J30.2 OTHER SEASONAL ALLERGIC RHINITIS: Chronic | Status: ACTIVE | Noted: 2017-03-06

## 2018-07-18 PROBLEM — E78.5 HYPERLIPIDEMIA, UNSPECIFIED: Chronic | Status: ACTIVE | Noted: 2017-11-16

## 2018-07-18 PROBLEM — C96.9 MALIGNANT NEOPLASM OF LYMPHOID, HEMATOPOIETIC AND RELATED TISSUE, UNSPECIFIED: Chronic | Status: ACTIVE | Noted: 2017-02-23

## 2018-07-18 PROBLEM — H26.9 UNSPECIFIED CATARACT: Chronic | Status: ACTIVE | Noted: 2017-11-16

## 2018-07-18 PROBLEM — C21.0 MALIGNANT NEOPLASM OF ANUS, UNSPECIFIED: Chronic | Status: ACTIVE | Noted: 2017-03-06

## 2018-07-18 PROBLEM — C21.0 MALIGNANT NEOPLASM OF ANUS, UNSPECIFIED: Chronic | Status: ACTIVE | Noted: 2017-11-16

## 2018-07-18 PROBLEM — E78.00 PURE HYPERCHOLESTEROLEMIA, UNSPECIFIED: Chronic | Status: ACTIVE | Noted: 2017-02-23

## 2018-07-18 PROBLEM — I10 ESSENTIAL (PRIMARY) HYPERTENSION: Chronic | Status: ACTIVE | Noted: 2017-11-16

## 2018-07-18 PROBLEM — J30.9 ALLERGIC RHINITIS, UNSPECIFIED: Chronic | Status: ACTIVE | Noted: 2017-11-16

## 2018-07-23 ENCOUNTER — RESULT REVIEW (OUTPATIENT)
Age: 68
End: 2018-07-23

## 2018-07-23 ENCOUNTER — APPOINTMENT (OUTPATIENT)
Dept: HEMATOLOGY ONCOLOGY | Facility: CLINIC | Age: 68
End: 2018-07-23
Payer: MEDICARE

## 2018-07-23 VITALS
HEIGHT: 68 IN | DIASTOLIC BLOOD PRESSURE: 55 MMHG | TEMPERATURE: 98.5 F | BODY MASS INDEX: 22.05 KG/M2 | HEART RATE: 66 BPM | OXYGEN SATURATION: 98 % | WEIGHT: 145.5 LBS | SYSTOLIC BLOOD PRESSURE: 125 MMHG

## 2018-07-23 LAB
BASOPHILS # BLD AUTO: 0 K/UL — SIGNIFICANT CHANGE UP (ref 0–0.2)
BASOPHILS NFR BLD AUTO: 0.8 % — SIGNIFICANT CHANGE UP (ref 0–2)
EOSINOPHIL # BLD AUTO: 0.1 K/UL — SIGNIFICANT CHANGE UP (ref 0–0.5)
EOSINOPHIL NFR BLD AUTO: 3.6 % — SIGNIFICANT CHANGE UP (ref 0–6)
HCT VFR BLD CALC: 37.3 % — SIGNIFICANT CHANGE UP (ref 34.5–45)
HGB BLD-MCNC: 12.4 G/DL — SIGNIFICANT CHANGE UP (ref 11.5–15.5)
LYMPHOCYTES # BLD AUTO: 0.7 K/UL — LOW (ref 1–3.3)
LYMPHOCYTES # BLD AUTO: 18.9 % — SIGNIFICANT CHANGE UP (ref 13–44)
MCHC RBC-ENTMCNC: 33.3 GM/DL — SIGNIFICANT CHANGE UP (ref 32–36)
MCHC RBC-ENTMCNC: 33.3 PG — SIGNIFICANT CHANGE UP (ref 27–34)
MCV RBC AUTO: 99.9 FL — SIGNIFICANT CHANGE UP (ref 80–100)
MONOCYTES # BLD AUTO: 0.3 K/UL — SIGNIFICANT CHANGE UP (ref 0–0.9)
MONOCYTES NFR BLD AUTO: 6.6 % — SIGNIFICANT CHANGE UP (ref 2–14)
NEUTROPHILS # BLD AUTO: 2.8 K/UL — SIGNIFICANT CHANGE UP (ref 1.8–7.4)
NEUTROPHILS NFR BLD AUTO: 70.1 % — SIGNIFICANT CHANGE UP (ref 43–77)
PLATELET # BLD AUTO: 170 K/UL — SIGNIFICANT CHANGE UP (ref 150–400)
RBC # BLD: 3.73 M/UL — LOW (ref 3.8–5.2)
RBC # FLD: 12.1 % — SIGNIFICANT CHANGE UP (ref 10.3–14.5)
WBC # BLD: 4 K/UL — SIGNIFICANT CHANGE UP (ref 3.8–10.5)
WBC # FLD AUTO: 4 K/UL — SIGNIFICANT CHANGE UP (ref 3.8–10.5)

## 2018-07-23 PROCEDURE — 99213 OFFICE O/P EST LOW 20 MIN: CPT

## 2018-07-24 LAB
ALBUMIN SERPL ELPH-MCNC: 4.1 G/DL
ALP BLD-CCNC: 68 U/L
ALT SERPL-CCNC: 20 U/L
ANION GAP SERPL CALC-SCNC: 10 MMOL/L
AST SERPL-CCNC: 21 U/L
BILIRUB SERPL-MCNC: 0.2 MG/DL
BUN SERPL-MCNC: 24 MG/DL
CALCIUM SERPL-MCNC: 9.1 MG/DL
CEA SERPL-MCNC: 2.3 NG/ML
CHLORIDE SERPL-SCNC: 104 MMOL/L
CO2 SERPL-SCNC: 28 MMOL/L
CREAT SERPL-MCNC: 0.78 MG/DL
GLUCOSE SERPL-MCNC: 105 MG/DL
MAGNESIUM SERPL-MCNC: 2.3 MG/DL
POTASSIUM SERPL-SCNC: 4.3 MMOL/L
PROT SERPL-MCNC: 6.4 G/DL
SODIUM SERPL-SCNC: 142 MMOL/L

## 2018-08-01 ENCOUNTER — FORM ENCOUNTER (OUTPATIENT)
Age: 68
End: 2018-08-01

## 2018-08-02 ENCOUNTER — OUTPATIENT (OUTPATIENT)
Dept: OUTPATIENT SERVICES | Facility: HOSPITAL | Age: 68
LOS: 1 days | End: 2018-08-02
Payer: MEDICARE

## 2018-08-02 ENCOUNTER — APPOINTMENT (OUTPATIENT)
Dept: CT IMAGING | Facility: CLINIC | Age: 68
End: 2018-08-02
Payer: MEDICARE

## 2018-08-02 DIAGNOSIS — C21.0 MALIGNANT NEOPLASM OF ANUS, UNSPECIFIED: ICD-10-CM

## 2018-08-02 DIAGNOSIS — Z98.890 OTHER SPECIFIED POSTPROCEDURAL STATES: Chronic | ICD-10-CM

## 2018-08-02 DIAGNOSIS — Z90.89 ACQUIRED ABSENCE OF OTHER ORGANS: Chronic | ICD-10-CM

## 2018-08-02 DIAGNOSIS — Z98.49 CATARACT EXTRACTION STATUS, UNSPECIFIED EYE: Chronic | ICD-10-CM

## 2018-08-02 DIAGNOSIS — Z90.710 ACQUIRED ABSENCE OF BOTH CERVIX AND UTERUS: Chronic | ICD-10-CM

## 2018-08-02 PROCEDURE — 74177 CT ABD & PELVIS W/CONTRAST: CPT

## 2018-08-02 PROCEDURE — 74177 CT ABD & PELVIS W/CONTRAST: CPT | Mod: 26

## 2018-08-13 ENCOUNTER — RESULT REVIEW (OUTPATIENT)
Age: 68
End: 2018-08-13

## 2018-08-13 ENCOUNTER — APPOINTMENT (OUTPATIENT)
Dept: HEMATOLOGY ONCOLOGY | Facility: CLINIC | Age: 68
End: 2018-08-13
Payer: MEDICARE

## 2018-08-13 VITALS
OXYGEN SATURATION: 99 % | HEIGHT: 68 IN | TEMPERATURE: 98.1 F | WEIGHT: 147.71 LBS | SYSTOLIC BLOOD PRESSURE: 133 MMHG | BODY MASS INDEX: 22.39 KG/M2 | DIASTOLIC BLOOD PRESSURE: 66 MMHG | HEART RATE: 67 BPM

## 2018-08-13 LAB
BASOPHILS # BLD AUTO: 0 K/UL — SIGNIFICANT CHANGE UP (ref 0–0.2)
BASOPHILS NFR BLD AUTO: 1.3 % — SIGNIFICANT CHANGE UP (ref 0–2)
EOSINOPHIL # BLD AUTO: 0.2 K/UL — SIGNIFICANT CHANGE UP (ref 0–0.5)
EOSINOPHIL NFR BLD AUTO: 5 % — SIGNIFICANT CHANGE UP (ref 0–6)
HCT VFR BLD CALC: 38.8 % — SIGNIFICANT CHANGE UP (ref 34.5–45)
HGB BLD-MCNC: 12.7 G/DL — SIGNIFICANT CHANGE UP (ref 11.5–15.5)
LYMPHOCYTES # BLD AUTO: 0.8 K/UL — LOW (ref 1–3.3)
LYMPHOCYTES # BLD AUTO: 21.2 % — SIGNIFICANT CHANGE UP (ref 13–44)
MCHC RBC-ENTMCNC: 32.7 GM/DL — SIGNIFICANT CHANGE UP (ref 32–36)
MCHC RBC-ENTMCNC: 33.2 PG — SIGNIFICANT CHANGE UP (ref 27–34)
MCV RBC AUTO: 101.5 FL — HIGH (ref 80–100)
MONOCYTES # BLD AUTO: 0.3 K/UL — SIGNIFICANT CHANGE UP (ref 0–0.9)
MONOCYTES NFR BLD AUTO: 7.3 % — SIGNIFICANT CHANGE UP (ref 2–14)
NEUTROPHILS # BLD AUTO: 2.4 K/UL — SIGNIFICANT CHANGE UP (ref 1.8–7.4)
NEUTROPHILS NFR BLD AUTO: 65.2 % — SIGNIFICANT CHANGE UP (ref 43–77)
PLATELET # BLD AUTO: 164 K/UL — SIGNIFICANT CHANGE UP (ref 150–400)
RBC # BLD: 3.82 M/UL — SIGNIFICANT CHANGE UP (ref 3.8–5.2)
RBC # FLD: 12.1 % — SIGNIFICANT CHANGE UP (ref 10.3–14.5)
WBC # BLD: 3.8 K/UL — SIGNIFICANT CHANGE UP (ref 3.8–10.5)
WBC # FLD AUTO: 3.8 K/UL — SIGNIFICANT CHANGE UP (ref 3.8–10.5)

## 2018-08-13 PROCEDURE — 99214 OFFICE O/P EST MOD 30 MIN: CPT

## 2018-08-14 LAB
ALBUMIN SERPL ELPH-MCNC: 4.1 G/DL
ALP BLD-CCNC: 70 U/L
ALT SERPL-CCNC: 17 U/L
ANION GAP SERPL CALC-SCNC: 14 MMOL/L
AST SERPL-CCNC: 18 U/L
BILIRUB SERPL-MCNC: 0.2 MG/DL
BUN SERPL-MCNC: 20 MG/DL
CALCIUM SERPL-MCNC: 9.3 MG/DL
CHLORIDE SERPL-SCNC: 103 MMOL/L
CO2 SERPL-SCNC: 26 MMOL/L
CREAT SERPL-MCNC: 0.8 MG/DL
GLUCOSE SERPL-MCNC: 97 MG/DL
MAGNESIUM SERPL-MCNC: 2.2 MG/DL
POTASSIUM SERPL-SCNC: 4.5 MMOL/L
PROT SERPL-MCNC: 6.6 G/DL
SODIUM SERPL-SCNC: 143 MMOL/L

## 2018-08-20 ENCOUNTER — RX RENEWAL (OUTPATIENT)
Age: 68
End: 2018-08-20

## 2018-09-04 ENCOUNTER — RX RENEWAL (OUTPATIENT)
Age: 68
End: 2018-09-04

## 2018-09-27 ENCOUNTER — RX RENEWAL (OUTPATIENT)
Age: 68
End: 2018-09-27

## 2018-10-02 ENCOUNTER — APPOINTMENT (OUTPATIENT)
Dept: FAMILY MEDICINE | Facility: CLINIC | Age: 68
End: 2018-10-02
Payer: MEDICARE

## 2018-10-02 VITALS
DIASTOLIC BLOOD PRESSURE: 72 MMHG | BODY MASS INDEX: 22.73 KG/M2 | SYSTOLIC BLOOD PRESSURE: 133 MMHG | HEIGHT: 68 IN | OXYGEN SATURATION: 98 % | WEIGHT: 150 LBS | HEART RATE: 70 BPM | RESPIRATION RATE: 15 BRPM

## 2018-10-02 DIAGNOSIS — D61.810 ANTINEOPLASTIC CHEMOTHERAPY INDUCED PANCYTOPENIA: ICD-10-CM

## 2018-10-02 LAB — CYTOLOGY CVX/VAG DOC THIN PREP: NORMAL

## 2018-10-02 PROCEDURE — 36415 COLL VENOUS BLD VENIPUNCTURE: CPT

## 2018-10-02 PROCEDURE — G0008: CPT

## 2018-10-02 PROCEDURE — 90662 IIV NO PRSV INCREASED AG IM: CPT

## 2018-10-02 PROCEDURE — G0439: CPT

## 2018-10-02 PROCEDURE — 90670 PCV13 VACCINE IM: CPT

## 2018-10-02 PROCEDURE — G0444 DEPRESSION SCREEN ANNUAL: CPT | Mod: 59

## 2018-10-02 PROCEDURE — G0009: CPT

## 2018-10-02 RX ORDER — LIDOCAINE HYDROCHLORIDE 20 MG/ML
2 SOLUTION OROPHARYNGEAL
Qty: 100 | Refills: 1 | Status: DISCONTINUED | COMMUNITY
Start: 2017-05-30 | End: 2018-10-02

## 2018-10-02 RX ORDER — SODIUM SULFATE, POTASSIUM SULFATE, MAGNESIUM SULFATE 17.5; 3.13; 1.6 G/ML; G/ML; G/ML
17.5-3.13-1.6 SOLUTION, CONCENTRATE ORAL
Qty: 1 | Refills: 0 | Status: DISCONTINUED | COMMUNITY
Start: 2018-01-26 | End: 2018-10-02

## 2018-10-02 RX ORDER — ACETAZOLAMIDE 500 MG/1
500 CAPSULE ORAL
Qty: 15 | Refills: 0 | Status: DISCONTINUED | COMMUNITY
Start: 2018-04-16 | End: 2018-10-02

## 2018-10-02 RX ORDER — PROCHLORPERAZINE MALEATE 10 MG/1
10 TABLET ORAL EVERY 6 HOURS
Qty: 120 | Refills: 0 | Status: DISCONTINUED | COMMUNITY
Start: 2017-04-17 | End: 2018-10-02

## 2018-10-02 RX ORDER — ONDANSETRON 8 MG/1
8 TABLET, ORALLY DISINTEGRATING ORAL EVERY 8 HOURS
Qty: 90 | Refills: 0 | Status: DISCONTINUED | COMMUNITY
Start: 2017-04-17 | End: 2018-10-02

## 2018-10-02 RX ORDER — SILVER SULFADIAZINE 10 MG/G
1 CREAM TOPICAL
Qty: 1 | Refills: 2 | Status: DISCONTINUED | COMMUNITY
Start: 2017-05-01 | End: 2018-10-02

## 2018-10-02 RX ORDER — DIPHENHYDRAMINE HYDROCHLORIDE AND LIDOCAINE HYDROCHLORIDE AND ALUMINUM HYDROXIDE AND MAGNESIUM HYDRO
KIT
Qty: 500 | Refills: 5 | Status: DISCONTINUED | COMMUNITY
Start: 2017-05-01 | End: 2018-10-02

## 2018-10-02 NOTE — HEALTH RISK ASSESSMENT
[Excellent] : ~his/her~  mood as  excellent [] : No [No falls in past year] : Patient reported no falls in the past year [0] : 2) Feeling down, depressed, or hopeless: Not at all (0) [Patient reported mammogram was normal] : Patient reported mammogram was normal [Patient reported colonoscopy was normal] : Patient reported colonoscopy was normal [HIV Test offered] : HIV Test offered [Hepatitis C test offered] : Hepatitis C test offered [Change in mental status noted] : No change in mental status noted [Language] : denies difficulty with language [Handling Complex Tasks] : denies difficulty handling complex tasks [None] : None [With Family] : lives with family [Retired] : retired [] :  [Feels Safe at Home] : Feels safe at home [Fully functional (bathing, dressing, toileting, transferring, walking, feeding)] : Fully functional (bathing, dressing, toileting, transferring, walking, feeding) [Fully functional (using the telephone, shopping, preparing meals, housekeeping, doing laundry, using] : Fully functional and needs no help or supervision to perform IADLs (using the telephone, shopping, preparing meals, housekeeping, doing laundry, using transportation, managing medications and managing finances) [Reports changes in hearing] : Reports no changes in hearing [Reports changes in vision] : Reports no changes in vision [Reports changes in dental health] : Reports no changes in dental health [Smoke Detector] : smoke detector [Carbon Monoxide Detector] : carbon monoxide detector [Seat Belt] :  uses seat belt [Sunscreen] : uses sunscreen [TB Exposure] : is not being exposed to tuberculosis [MammogramDate] : 2/2017 [ColonoscopyDate] : 3/2018 [Discussed at today's visit] : Advance Directives Discussed at today's visit [Designated Healthcare Proxy] : Designated healthcare proxy [Name: ___] : Health Care Proxy's Name: [unfilled]  [Relationship: ___] : Relationship: [unfilled]

## 2018-10-02 NOTE — ASSESSMENT
[FreeTextEntry1] : Annual\par Hyperlipidemia: check labs\par Anal cancer: seen by oncologist, PEt negative\par Flu vaccine administered

## 2018-10-23 ENCOUNTER — RX RENEWAL (OUTPATIENT)
Age: 68
End: 2018-10-23

## 2018-11-26 ENCOUNTER — RX RENEWAL (OUTPATIENT)
Age: 68
End: 2018-11-26

## 2018-12-04 ENCOUNTER — NON-APPOINTMENT (OUTPATIENT)
Age: 68
End: 2018-12-04

## 2018-12-04 ENCOUNTER — APPOINTMENT (OUTPATIENT)
Dept: CARDIOLOGY | Facility: CLINIC | Age: 68
End: 2018-12-04
Payer: MEDICARE

## 2018-12-04 VITALS
SYSTOLIC BLOOD PRESSURE: 147 MMHG | HEART RATE: 69 BPM | BODY MASS INDEX: 22.73 KG/M2 | TEMPERATURE: 97.9 F | RESPIRATION RATE: 20 BRPM | WEIGHT: 150 LBS | OXYGEN SATURATION: 98 % | HEIGHT: 68 IN | DIASTOLIC BLOOD PRESSURE: 69 MMHG

## 2018-12-04 PROCEDURE — 99214 OFFICE O/P EST MOD 30 MIN: CPT

## 2018-12-04 PROCEDURE — 93000 ELECTROCARDIOGRAM COMPLETE: CPT

## 2018-12-04 NOTE — HISTORY OF PRESENT ILLNESS
[FreeTextEntry1] : Patient presented in the spring with possible paroxysmal atrial fibrillation prior to a procedure.  This was seen on a monitor with no documentation.  Subsequent workup was normal in terms of echocardiogram and no further arrhythmia.  She has no current complaints.

## 2018-12-04 NOTE — PHYSICAL EXAM
[General Appearance - Well Developed] : well developed [Normal Appearance] : normal appearance [Well Groomed] : well groomed [General Appearance - Well Nourished] : well nourished [No Deformities] : no deformities [General Appearance - In No Acute Distress] : no acute distress [Normal Conjunctiva] : the conjunctiva exhibited no abnormalities [Eyelids - No Xanthelasma] : the eyelids demonstrated no xanthelasmas [Normal Oral Mucosa] : normal oral mucosa [No Oral Pallor] : no oral pallor [No Oral Cyanosis] : no oral cyanosis [Normal Jugular Venous A Waves Present] : normal jugular venous A waves present [Normal Jugular Venous V Waves Present] : normal jugular venous V waves present [No Jugular Venous Phipps A Waves] : no jugular venous phipps A waves [Respiration, Rhythm And Depth] : normal respiratory rhythm and effort [Exaggerated Use Of Accessory Muscles For Inspiration] : no accessory muscle use [Auscultation Breath Sounds / Voice Sounds] : lungs were clear to auscultation bilaterally [Heart Rate And Rhythm] : heart rate and rhythm were normal [Heart Sounds] : normal S1 and S2 [Murmurs] : no murmurs present [Abdomen Soft] : soft [Abdomen Tenderness] : non-tender [Abdomen Mass (___ Cm)] : no abdominal mass palpated [Abnormal Walk] : normal gait [Gait - Sufficient For Exercise Testing] : the gait was sufficient for exercise testing [Nail Clubbing] : no clubbing of the fingernails [Cyanosis, Localized] : no localized cyanosis [Petechial Hemorrhages (___cm)] : no petechial hemorrhages [Skin Color & Pigmentation] : normal skin color and pigmentation [] : no rash [No Venous Stasis] : no venous stasis [Skin Lesions] : no skin lesions [No Skin Ulcers] : no skin ulcer [No Xanthoma] : no  xanthoma was observed [Oriented To Time, Place, And Person] : oriented to person, place, and time [Affect] : the affect was normal [Mood] : the mood was normal [No Anxiety] : not feeling anxious

## 2018-12-04 NOTE — DISCUSSION/SUMMARY
[FreeTextEntry1] : Patient is doing well on beta blocker.  No evidence of previous or current atrial fibrillation.  See in 12months

## 2019-01-28 ENCOUNTER — LABORATORY RESULT (OUTPATIENT)
Age: 69
End: 2019-01-28

## 2019-01-30 ENCOUNTER — MEDICATION RENEWAL (OUTPATIENT)
Age: 69
End: 2019-01-30

## 2019-01-30 LAB
25(OH)D3 SERPL-MCNC: 27.9 NG/ML
APPEARANCE: CLEAR
BILIRUBIN URINE: NEGATIVE
BLOOD URINE: ABNORMAL
CHOLEST SERPL-MCNC: 265 MG/DL
CHOLEST/HDLC SERPL: 3.4 RATIO
COLOR: YELLOW
GLUCOSE QUALITATIVE U: NEGATIVE MG/DL
HBA1C MFR BLD HPLC: 5.4 %
HDLC SERPL-MCNC: 77 MG/DL
KETONES URINE: NEGATIVE
LDLC SERPL CALC-MCNC: 160 MG/DL
LEUKOCYTE ESTERASE URINE: NEGATIVE
NITRITE URINE: NEGATIVE
PH URINE: 6
PROTEIN URINE: NEGATIVE MG/DL
SPECIFIC GRAVITY URINE: 1.02
T4 SERPL-MCNC: 6.8 UG/DL
TRIGL SERPL-MCNC: 142 MG/DL
TSH SERPL-ACNC: 4.33 UIU/ML
URATE SERPL-MCNC: 6.6 MG/DL
UROBILINOGEN URINE: NEGATIVE MG/DL

## 2019-01-31 ENCOUNTER — OUTPATIENT (OUTPATIENT)
Dept: OUTPATIENT SERVICES | Facility: HOSPITAL | Age: 69
LOS: 1 days | Discharge: HOME | End: 2019-01-31

## 2019-01-31 DIAGNOSIS — Z90.89 ACQUIRED ABSENCE OF OTHER ORGANS: Chronic | ICD-10-CM

## 2019-01-31 DIAGNOSIS — Z98.890 OTHER SPECIFIED POSTPROCEDURAL STATES: Chronic | ICD-10-CM

## 2019-01-31 DIAGNOSIS — Z90.710 ACQUIRED ABSENCE OF BOTH CERVIX AND UTERUS: Chronic | ICD-10-CM

## 2019-01-31 DIAGNOSIS — C21.0 MALIGNANT NEOPLASM OF ANUS, UNSPECIFIED: ICD-10-CM

## 2019-01-31 DIAGNOSIS — Z98.49 CATARACT EXTRACTION STATUS, UNSPECIFIED EYE: Chronic | ICD-10-CM

## 2019-02-11 ENCOUNTER — APPOINTMENT (OUTPATIENT)
Dept: HEMATOLOGY ONCOLOGY | Facility: CLINIC | Age: 69
End: 2019-02-11
Payer: MEDICARE

## 2019-02-11 ENCOUNTER — RESULT REVIEW (OUTPATIENT)
Age: 69
End: 2019-02-11

## 2019-02-11 VITALS
WEIGHT: 154.04 LBS | HEART RATE: 65 BPM | TEMPERATURE: 97.8 F | SYSTOLIC BLOOD PRESSURE: 145 MMHG | OXYGEN SATURATION: 100 % | BODY MASS INDEX: 23.35 KG/M2 | HEIGHT: 68 IN | DIASTOLIC BLOOD PRESSURE: 76 MMHG

## 2019-02-11 LAB
BASOPHILS # BLD AUTO: 0 K/UL — SIGNIFICANT CHANGE UP (ref 0–0.2)
BASOPHILS NFR BLD AUTO: 1 % — SIGNIFICANT CHANGE UP (ref 0–2)
EOSINOPHIL # BLD AUTO: 0.2 K/UL — SIGNIFICANT CHANGE UP (ref 0–0.5)
EOSINOPHIL NFR BLD AUTO: 4.6 % — SIGNIFICANT CHANGE UP (ref 0–6)
HCT VFR BLD CALC: 40.6 % — SIGNIFICANT CHANGE UP (ref 34.5–45)
HGB BLD-MCNC: 12.9 G/DL — SIGNIFICANT CHANGE UP (ref 11.5–15.5)
LYMPHOCYTES # BLD AUTO: 0.9 K/UL — LOW (ref 1–3.3)
LYMPHOCYTES # BLD AUTO: 24.6 % — SIGNIFICANT CHANGE UP (ref 13–44)
MCHC RBC-ENTMCNC: 31.7 PG — SIGNIFICANT CHANGE UP (ref 27–34)
MCHC RBC-ENTMCNC: 31.9 G/DL — LOW (ref 32–36)
MCV RBC AUTO: 99.3 FL — SIGNIFICANT CHANGE UP (ref 80–100)
MONOCYTES # BLD AUTO: 0.3 K/UL — SIGNIFICANT CHANGE UP (ref 0–0.9)
MONOCYTES NFR BLD AUTO: 8.3 % — SIGNIFICANT CHANGE UP (ref 2–14)
NEUTROPHILS # BLD AUTO: 2.2 K/UL — SIGNIFICANT CHANGE UP (ref 1.8–7.4)
NEUTROPHILS NFR BLD AUTO: 61.4 % — SIGNIFICANT CHANGE UP (ref 43–77)
PLATELET # BLD AUTO: 188 K/UL — SIGNIFICANT CHANGE UP (ref 150–400)
RBC # BLD: 4.08 M/UL — SIGNIFICANT CHANGE UP (ref 3.8–5.2)
RBC # FLD: 11.8 % — SIGNIFICANT CHANGE UP (ref 10.3–14.5)
WBC # BLD: 3.7 K/UL — LOW (ref 3.8–10.5)
WBC # FLD AUTO: 3.7 K/UL — LOW (ref 3.8–10.5)

## 2019-02-11 PROCEDURE — 99213 OFFICE O/P EST LOW 20 MIN: CPT

## 2019-02-11 NOTE — HISTORY OF PRESENT ILLNESS
If you have any questions or concerns about today's appointment, the verbal and/or written instructions you were given for follow up care, please call our office at [phone].     Wendy Tamayo [facility] [address], [address] [place] [address]    [phone] office  [phone] [FreeTextEntry1] : Annual [de-identified] : 68 yr old female with  Hyperlipidemia and anal cancer is here for an annul\par

## 2019-02-12 LAB
ALBUMIN SERPL ELPH-MCNC: 4.4 G/DL
ALP BLD-CCNC: 82 U/L
ALT SERPL-CCNC: 21 U/L
ANION GAP SERPL CALC-SCNC: 9 MMOL/L
AST SERPL-CCNC: 24 U/L
BILIRUB SERPL-MCNC: 0.3 MG/DL
BUN SERPL-MCNC: 18 MG/DL
CALCIUM SERPL-MCNC: 9.4 MG/DL
CEA SERPL-MCNC: 2.2 NG/ML
CHLORIDE SERPL-SCNC: 103 MMOL/L
CO2 SERPL-SCNC: 26 MMOL/L
CREAT SERPL-MCNC: 0.94 MG/DL
GLUCOSE SERPL-MCNC: 94 MG/DL
MAGNESIUM SERPL-MCNC: 2.2 MG/DL
POTASSIUM SERPL-SCNC: 4.4 MMOL/L
PROT SERPL-MCNC: 6.8 G/DL
SODIUM SERPL-SCNC: 138 MMOL/L

## 2019-02-12 NOTE — PHYSICAL EXAM
[Restricted in physically strenuous activity but ambulatory and able to carry out work of a light or sedentary nature] : Status 1- Restricted in physically strenuous activity but ambulatory and able to carry out work of a light or sedentary nature, e.g., light house work, office work [Normal] : affect appropriate [de-identified] : lower mid lip [FreeTextEntry1] : +erythema with healing skin (improved compared to the last visit) [de-identified] : Right inguinal lymphadenopathy ~3x2 cm, mobile.

## 2019-02-12 NOTE — HISTORY OF PRESENT ILLNESS
[de-identified] : The patient was diagnosed with SCC of the anus in January of 2017 at the age of 66.  The patient initially reported an enlarged lymph node in her right inguinal region and went to her gynecologist for evaluation.  Following an ultrasound of the right inguinal lymph node, an FNA on 1/31/2017 revealed atypical squamous cells, cystic, "suspicious for squamous cell carcinoma".  Patient reports she has not had a pap test since age 63.  Her last mammogram was about 3 years ago, and she has not seen a dermatologist.  Pt. also reports recent bright red blood per rectum along with a change in bowel habits for a short time. Denies any vaginal bleeding or discharge, abdominal pain.  [de-identified] : Patient presents today s/p CRT with 5FU and MMC completed on 6/1/17.  She notes that she is doing very well and has a good appetite, weight stable.  She denies abdominal pain, melena, hematochezia, BPBRP, dyschezia, any recent problems or surgeries.  No other new complaints today. \par \par \par \par

## 2019-02-12 NOTE — ADDENDUM
[FreeTextEntry1] : I, Tigre Lagunas, acted solely as a scribe for Dr. Elza Rosario on this date 2/11/19.

## 2019-02-12 NOTE — REVIEW OF SYSTEMS
[Mucosal Pain] : mucosal pain [Diarrhea] : diarrhea [Negative] : Allergic/Immunologic [FreeTextEntry4] : mucositis [FreeTextEntry7] : per history of present illness.  Intermittent diarrhea improves with Imodium.

## 2019-02-12 NOTE — RESULTS/DATA
[FreeTextEntry1] : 8/2/18 CT A/P:  No evidence of metastatic disease in the abdomen or pelvis. \par \par 4/3/18 CT Chest:  stable few small ( less than 4 mm) solid nodules in both lungs.  No new nodules.  \par \par 1/11/18 CT CAP: Biapical scarring, unchanged. A 4 mm nodule in the left upper lobe is unchanged. A 2 mm nodule in the right upper lobe is new from 2/15/2017. A 2 mm nodule in the right lower lobe opacity, unchanged. 5 mm nodule left lower lobe is unchanged. Nodular thickening of the posterior perianal region is grossly unchanged. Previously noted prominent perirectal lymph nodes in the mesorectal fat are resolved.There is mild perirectal fat stranding, likely secondary to post radiation changes. Colonic diverticulosis. Previously seen enlarged lymph node in the right inguinal region is no longer visualized. \par \par 3/24/17 Colonoscopy:  T3 anal mass with 4 LN that  were 3-10 mm.  \par \par 3/13/17 MRI of abdomen/Pelvis:  LIVER: The 1 cm indeterminant lesion on the prior CT scan corresponds to a simple cyst with thin septation in segment IVb. There is also a 3 mm cyst in segment 7.  Pelvic MRI:  TUMOR LOCATION (Quadrant): Anorectal junction, posterior wall. DISTANCE OF MOST INFERIOR PORTION OF LESION TO THE ANAL VERGE: 2-3cm.  CRANIOCAUDAL EXTENT FROM MOST INFERIOR PORTION OF THE TUMOR: 2.4 cm.  TUMOR CHARACTERISTICS: Semiannular enhancing lesion.  TUMOR SIZE (AP x Transverse x Craniocaudal): 2.8 x 2 cm.  Involvement of the superior internal sphincter and puborectalis sling posteriorly.\par T CATEGORY: T2.  REGIONAL LYMPH NODES (suspicious = irregular border, mixed signal intensity and/or >5mm): Multiple perirectal (approximately 4) the largest 9 mm left superior presacral. Bilateral internal iliac acute 7 mm. Necrotic right inguinal 1.5 x 2.6 cm. All nodes are stable.\par \par 3/6/17 Anal biopsy:  BIOPSIES (ANAL MASS): SQUAMOUS CELL CARCINOMA, IN REGIONS, POORLY DIFFERENTIATED, EXTENSIVELY INVASIVE.\par - FOCI OF BASALOID DIFFERENTIATION PRESENT.\par - NO DEFINITIVE LYMPHOVASCULAR INVASION.\par - TUMOR UNDERMINES RECTAL MUCOSA.\par BIOPSY (ANAL MASS, IVONNE-CUT):\par - A FEW FOCI CONSISTENT WITH SQUAMOUS CELL CARCINOMA\par BIOPSY (PERIANAL SKIN):\par - NEGATIVE FOR MALIGNANCY\par - DIATATED FOLLICLE WITH ASSOCIATED DERMAL FIBROSIS.\par \par PET/CT 3/2/17:  Right inguinal lymph node 2.5 x 2.0 cm (SUV 5.4;Right internal iliac lymph node, better delineated on CT (SUV4.9). Multiple FDG avid lymph nodes, better seen on CT. For example left perirectal lymph node, SUV 3.1 lymph node along the left rectal wall, SUV 11.8 presacral/perisigmoid lymph node, 1.0 x 0.9 cm (SUV 5.4)\par BOWEL/PERITONEUM/MESENTERY: FDG avid rectal lesion, poorly delineated on CT due to lack of oral contrast (SUV 11.8). Infiltration of the\par perirectal fat.\par \par 2/23/17 Anal biopsy:  Pathology negative for malignancy.  Mild,non-specific inflammation.  \par \par 2/15/17 CT Neck/Chest/Abd/Pelvis:  Mild asymmetric nonspecific enlargement of the left palatine tonsil, and mild nonspecific enlargement of lingual tonsils at the tongue base.  There is a 4 mm nodule in the right lobe of the thyroid gland.  LIVER: There is a 1.0 cm indeterminant lesion in the medial segment.  BOWEL: There is a nodular appearance to the posterior perianal region and focal thickening involving the anorectal region. There are prominent perirectal lymph nodes in the mesorectal fat measuring up to 9 mm., anorectal lesion should be considered.  RETROPERITONEUM: there is a nonenlarged right internal iliac chain lymph node measuring 0.9 cm with an abnormal morphology.  ABDOMINAL WALL: There is an abnormal appearing right inguinal lymph node which contains both solid and cystic components measuring 2.9 x 2.1 cm \par \par 2/13/17 B/L Mammography ([facility]) : Normal \par \par 1/31/17 LYMPH NODE, GROIN, RIGHT, FNA:  SUSPICIOUS FOR MALIGNANCY.\par \par 1/31/17 Sonography of the right groin was performed again demonstrating the 3.1 x 1.7 x 2.4 cm cystic and solid mass which was selected for biopsy.\par

## 2019-02-19 ENCOUNTER — RX RENEWAL (OUTPATIENT)
Age: 69
End: 2019-02-19

## 2019-03-12 ENCOUNTER — APPOINTMENT (OUTPATIENT)
Dept: COLORECTAL SURGERY | Facility: CLINIC | Age: 69
End: 2019-03-12
Payer: MEDICARE

## 2019-03-12 VITALS
HEART RATE: 65 BPM | DIASTOLIC BLOOD PRESSURE: 84 MMHG | WEIGHT: 154 LBS | SYSTOLIC BLOOD PRESSURE: 137 MMHG | BODY MASS INDEX: 23.34 KG/M2 | RESPIRATION RATE: 14 BRPM | HEIGHT: 68 IN

## 2019-03-12 PROCEDURE — 46600 DIAGNOSTIC ANOSCOPY SPX: CPT

## 2019-03-12 PROCEDURE — 99213 OFFICE O/P EST LOW 20 MIN: CPT | Mod: 25

## 2019-03-12 NOTE — HISTORY OF PRESENT ILLNESS
[FreeTextEntry1] : Ms. Amin presents to the office for followup after completing CRT on 6/10/17 for a T1N2M0 anal SCC that was originally diagnosed in 3//2017. She continues to report 2 well formed BMs on a daily basis without associated pain, bleeding or incontinent episodes. Last colonoscopy 3/24/18 without e/o recurrence.

## 2019-03-12 NOTE — PHYSICAL EXAM
[Nonprolapsing] : a nonprolapsing (grade I) [Skin Tags] : residual hemorrhoidal skin tags were noted [Normal] : was normal [None] : there was no rectal abscess [Normal rectal exam] : exam was normal [Stool Sample Taken] : no stool obtained on rectal exam [Occult Blood Positive] : was negative for occult blood [Gross Blood] : no gross blood [Fecal Impaction] : no fecal impaction was present [de-identified] : D [de-identified] : Desquamated perianal skin [de-identified] : small left posterior and right posterior, larger anteriorly [de-identified] : no obvious scar [de-identified] : NAD [de-identified] : A&O x 3

## 2019-03-12 NOTE — REASON FOR VISIT
[Follow-Up: _____] : a [unfilled] follow-up visit [FreeTextEntry1] : h/o T1N2M0 anal SCC (posteriorly based)

## 2019-03-12 NOTE — ASSESSMENT
[FreeTextEntry1] : Ms. Amin presents to the office for followup of her h/o anal SCC. She finished chemoradiation treatment 6/2017 . She denies any anal pain, rectal bleeding, or fecal incontinent episodes. She passes two well-formed but soft movements on a daily basis every morning. She has been active and feels much improved.\par ERIKA and anoscopy in office today were fairly unremarkable. I have reassured her of this and will see her back in 6 months' time for repeat anoscopy.

## 2019-04-02 ENCOUNTER — FORM ENCOUNTER (OUTPATIENT)
Age: 69
End: 2019-04-02

## 2019-04-03 ENCOUNTER — OUTPATIENT (OUTPATIENT)
Dept: OUTPATIENT SERVICES | Facility: HOSPITAL | Age: 69
LOS: 1 days | End: 2019-04-03
Payer: MEDICARE

## 2019-04-03 ENCOUNTER — APPOINTMENT (OUTPATIENT)
Dept: CT IMAGING | Facility: CLINIC | Age: 69
End: 2019-04-03
Payer: MEDICARE

## 2019-04-03 DIAGNOSIS — Z98.49 CATARACT EXTRACTION STATUS, UNSPECIFIED EYE: Chronic | ICD-10-CM

## 2019-04-03 DIAGNOSIS — Z98.890 OTHER SPECIFIED POSTPROCEDURAL STATES: Chronic | ICD-10-CM

## 2019-04-03 DIAGNOSIS — R91.1 SOLITARY PULMONARY NODULE: ICD-10-CM

## 2019-04-03 DIAGNOSIS — Z90.89 ACQUIRED ABSENCE OF OTHER ORGANS: Chronic | ICD-10-CM

## 2019-04-03 DIAGNOSIS — Z90.710 ACQUIRED ABSENCE OF BOTH CERVIX AND UTERUS: Chronic | ICD-10-CM

## 2019-04-03 PROCEDURE — 74177 CT ABD & PELVIS W/CONTRAST: CPT | Mod: 26

## 2019-04-03 PROCEDURE — 71260 CT THORAX DX C+: CPT | Mod: 26

## 2019-04-03 PROCEDURE — 74177 CT ABD & PELVIS W/CONTRAST: CPT

## 2019-04-03 PROCEDURE — 82565 ASSAY OF CREATININE: CPT

## 2019-04-03 PROCEDURE — 71260 CT THORAX DX C+: CPT

## 2019-04-15 ENCOUNTER — RX RENEWAL (OUTPATIENT)
Age: 69
End: 2019-04-15

## 2019-05-15 ENCOUNTER — RX RENEWAL (OUTPATIENT)
Age: 69
End: 2019-05-15

## 2019-07-12 ENCOUNTER — RX RENEWAL (OUTPATIENT)
Age: 69
End: 2019-07-12

## 2019-07-25 NOTE — H&P PST ADULT - PSYCHIATRIC
H/O cardiomyopathy negative Affect and characteristics of appearance, verbalizations, behaviors are appropriate Osteomyelitis of toe of right foot Infectious disease

## 2019-07-31 ENCOUNTER — APPOINTMENT (OUTPATIENT)
Dept: INTERNAL MEDICINE | Facility: CLINIC | Age: 69
End: 2019-07-31
Payer: MEDICARE

## 2019-07-31 VITALS
RESPIRATION RATE: 14 BRPM | OXYGEN SATURATION: 98 % | HEIGHT: 68 IN | HEART RATE: 74 BPM | BODY MASS INDEX: 23.34 KG/M2 | SYSTOLIC BLOOD PRESSURE: 128 MMHG | WEIGHT: 154 LBS | DIASTOLIC BLOOD PRESSURE: 84 MMHG

## 2019-07-31 PROCEDURE — G0444 DEPRESSION SCREEN ANNUAL: CPT | Mod: 59

## 2019-07-31 PROCEDURE — 90715 TDAP VACCINE 7 YRS/> IM: CPT | Mod: GY

## 2019-07-31 PROCEDURE — 36415 COLL VENOUS BLD VENIPUNCTURE: CPT

## 2019-07-31 PROCEDURE — 90471 IMMUNIZATION ADMIN: CPT | Mod: GY

## 2019-07-31 PROCEDURE — G0439: CPT

## 2019-08-04 ENCOUNTER — TRANSCRIPTION ENCOUNTER (OUTPATIENT)
Age: 69
End: 2019-08-04

## 2019-08-04 LAB
ALBUMIN SERPL ELPH-MCNC: 4.5 G/DL
ALP BLD-CCNC: 80 U/L
ALT SERPL-CCNC: 26 U/L
ANION GAP SERPL CALC-SCNC: 13 MMOL/L
APPEARANCE: CLEAR
AST SERPL-CCNC: 27 U/L
BACTERIA: NEGATIVE
BASOPHILS # BLD AUTO: 0.02 K/UL
BASOPHILS NFR BLD AUTO: 0.6 %
BILIRUB SERPL-MCNC: 0.2 MG/DL
BILIRUBIN URINE: NEGATIVE
BLOOD URINE: ABNORMAL
BUN SERPL-MCNC: 20 MG/DL
CALCIUM SERPL-MCNC: 9.5 MG/DL
CHLORIDE SERPL-SCNC: 108 MMOL/L
CHOLEST SERPL-MCNC: 184 MG/DL
CHOLEST/HDLC SERPL: 2.6 RATIO
CO2 SERPL-SCNC: 21 MMOL/L
COLOR: NORMAL
CREAT SERPL-MCNC: 0.86 MG/DL
EOSINOPHIL # BLD AUTO: 0.12 K/UL
EOSINOPHIL NFR BLD AUTO: 3.3 %
GLUCOSE QUALITATIVE U: NEGATIVE
GLUCOSE SERPL-MCNC: 98 MG/DL
HCT VFR BLD CALC: 40.2 %
HDLC SERPL-MCNC: 72 MG/DL
HGB BLD-MCNC: 12.7 G/DL
HYALINE CASTS: 1 /LPF
IMM GRANULOCYTES NFR BLD AUTO: 0 %
KETONES URINE: NEGATIVE
LDLC SERPL CALC-MCNC: 88 MG/DL
LEUKOCYTE ESTERASE URINE: NEGATIVE
LYMPHOCYTES # BLD AUTO: 0.89 K/UL
LYMPHOCYTES NFR BLD AUTO: 24.8 %
MAN DIFF?: NORMAL
MCHC RBC-ENTMCNC: 31.6 GM/DL
MCHC RBC-ENTMCNC: 32.7 PG
MCV RBC AUTO: 103.6 FL
MICROSCOPIC-UA: NORMAL
MONOCYTES # BLD AUTO: 0.33 K/UL
MONOCYTES NFR BLD AUTO: 9.2 %
NEUTROPHILS # BLD AUTO: 2.23 K/UL
NEUTROPHILS NFR BLD AUTO: 62.1 %
NITRITE URINE: NEGATIVE
PH URINE: 5.5
PLATELET # BLD AUTO: 186 K/UL
POTASSIUM SERPL-SCNC: 4.5 MMOL/L
PROT SERPL-MCNC: 6.9 G/DL
PROTEIN URINE: NEGATIVE
RBC # BLD: 3.88 M/UL
RBC # FLD: 12.9 %
RED BLOOD CELLS URINE: 1 /HPF
SODIUM SERPL-SCNC: 142 MMOL/L
SPECIFIC GRAVITY URINE: 1.01
SQUAMOUS EPITHELIAL CELLS: 3 /HPF
TRIGL SERPL-MCNC: 119 MG/DL
TSH SERPL-ACNC: 4.26 UIU/ML
UROBILINOGEN URINE: NORMAL
WBC # FLD AUTO: 3.59 K/UL
WHITE BLOOD CELLS URINE: 3 /HPF

## 2019-08-05 ENCOUNTER — RX CHANGE (OUTPATIENT)
Age: 69
End: 2019-08-05

## 2019-08-06 ENCOUNTER — MEDICATION RENEWAL (OUTPATIENT)
Age: 69
End: 2019-08-06

## 2019-08-08 RX ORDER — FLUTICASONE PROPIONATE 50 UG/1
50 SPRAY, METERED NASAL
Refills: 0 | Status: ACTIVE | COMMUNITY
Start: 2019-08-08

## 2019-08-08 RX ORDER — ROSUVASTATIN CALCIUM 5 MG/1
5 TABLET, FILM COATED ORAL
Qty: 90 | Refills: 1 | Status: DISCONTINUED | COMMUNITY
Start: 2019-04-15 | End: 2019-08-08

## 2019-08-08 NOTE — ADDENDUM
[FreeTextEntry1] : I, Taty Grande, acted solely as a scribe for Dr. Morales on this date [7/31/2019].

## 2019-08-08 NOTE — ASSESSMENT
[FreeTextEntry1] : Labs drawn in office. Further recommendations based on results.\par Tdap administered in left deltoid in office with no localized reaction.\par Rx given for DEXA.\par Mammogram in 2018.\par Colonoscopy in 2018.\par Regular GYN visits.

## 2019-08-08 NOTE — END OF VISIT
[FreeTextEntry3] : All medical record entries made by the Scribe were at my, Dr. Morales's, direction and personally dictated by me on [7/31/2019]. I have reviewed the chart and agree that the record accurately reflects my personal performance of the history, physical exam, assessment and plan. I have also personally directed, reviewed and agreed with the chart.

## 2019-08-08 NOTE — HEALTH RISK ASSESSMENT
[Very Good] : ~his/her~ current health as very good [Good] : ~his/her~  mood as  good [Yes] : Yes [Monthly or less (1 pt)] : Monthly or less (1 point) [1 or 2 (0 pts)] : 1 or 2 (0 points) [Never (0 pts)] : Never (0 points) [No] : In the past 12 months have you used drugs other than those required for medical reasons? No [No falls in past year] : Patient reported no falls in the past year [0] : 2) Feeling down, depressed, or hopeless: Not at all (0) [Patient reported mammogram was normal] : Patient reported mammogram was normal [With Significant Other] : lives with significant other [None] : None [Retired] : retired [] :  [# Of Children ___] : has [unfilled] children [Feels Safe at Home] : Feels safe at home [Fully functional (bathing, dressing, toileting, transferring, walking, feeding)] : Fully functional (bathing, dressing, toileting, transferring, walking, feeding) [Fully functional (using the telephone, shopping, preparing meals, housekeeping, doing laundry, using] : Fully functional and needs no help or supervision to perform IADLs (using the telephone, shopping, preparing meals, housekeeping, doing laundry, using transportation, managing medications and managing finances) [Reports changes in hearing] : Reports changes in hearing [Reports normal functional visual acuity (ie: able to read med bottle)] : Reports normal functional visual acuity [Smoke Detector] : smoke detector [Carbon Monoxide Detector] : carbon monoxide detector [Seat Belt] :  uses seat belt [Safety elements used in home] : safety elements used in home [With Patient/Caregiver] : With Patient/Caregiver [Designated Healthcare Proxy] : Designated healthcare proxy [Relationship: ___] : Relationship: [unfilled] [Name: ___] : Health Care Proxy's Name: [unfilled]  [] : No [FreeTextEntry1] : hyperlipidemia [de-identified] : none [de-identified] : Dr. Campbell - GYN, Dr. East - Cardiologist, Dr. Sanchez - surgeon, Dr. Rosario - [place], Dr. Franklin - ENT, dermatologist in [place] [de-identified] : rarely [Audit-CScore] : 1 [de-identified] : walking, yoga [de-identified] : pretty good [[id number]] : 0 [Change in mental status noted] : No change in mental status noted [Behavior] : denies difficulty with behavior [Language] : denies difficulty with language [Handling Complex Tasks] : denies difficulty handling complex tasks [Learning/Retaining New Information] : denies difficulty learning/retaining new information [Reasoning] : denies difficulty with reasoning [Spatial Ability and Orientation] : denies difficulty with spatial ability and orientation [Reports changes in vision] : Reports no changes in vision [Reports changes in dental health] : Reports no changes in dental health [MammogramDate] : 01/18 [de-identified] :  [de-identified] : has hearing aides [AdvancecareDate] : 07/19

## 2019-08-08 NOTE — PHYSICAL EXAM
[No Acute Distress] : no acute distress [Well Nourished] : well nourished [Well Developed] : well developed [Well-Appearing] : well-appearing [Normal Sclera/Conjunctiva] : normal sclera/conjunctiva [PERRL] : pupils equal round and reactive to light [EOMI] : extraocular movements intact [Normal Outer Ear/Nose] : the outer ears and nose were normal in appearance [Normal Oropharynx] : the oropharynx was normal [No JVD] : no jugular venous distention [No Lymphadenopathy] : no lymphadenopathy [Supple] : supple [Thyroid Normal, No Nodules] : the thyroid was normal and there were no nodules present [No Respiratory Distress] : no respiratory distress  [No Accessory Muscle Use] : no accessory muscle use [Clear to Auscultation] : lungs were clear to auscultation bilaterally [Normal Rate] : normal rate  [Regular Rhythm] : with a regular rhythm [Normal S1, S2] : normal S1 and S2 [No Murmur] : no murmur heard [No Carotid Bruits] : no carotid bruits [No Abdominal Bruit] : a ~M bruit was not heard ~T in the abdomen [No Varicosities] : no varicosities [Pedal Pulses Present] : the pedal pulses are present [No Edema] : there was no peripheral edema [No Palpable Aorta] : no palpable aorta [No Extremity Clubbing/Cyanosis] : no extremity clubbing/cyanosis [Soft] : abdomen soft [Non Tender] : non-tender [Non-distended] : non-distended [No Masses] : no abdominal mass palpated [No HSM] : no HSM [Normal Bowel Sounds] : normal bowel sounds [Normal Posterior Cervical Nodes] : no posterior cervical lymphadenopathy [Normal Anterior Cervical Nodes] : no anterior cervical lymphadenopathy [No CVA Tenderness] : no CVA  tenderness [No Spinal Tenderness] : no spinal tenderness [No Joint Swelling] : no joint swelling [Grossly Normal Strength/Tone] : grossly normal strength/tone [No Rash] : no rash [Coordination Grossly Intact] : coordination grossly intact [No Focal Deficits] : no focal deficits [Normal Gait] : normal gait [Deep Tendon Reflexes (DTR)] : deep tendon reflexes were 2+ and symmetric [Normal Affect] : the affect was normal [Normal Insight/Judgement] : insight and judgment were intact [de-identified] : by gyn

## 2019-08-08 NOTE — HISTORY OF PRESENT ILLNESS
[de-identified] : Patient presents for initial annual wellness visit. History is significant for anal cancer s/p chemo, RT completed 6/17, endometriosis s/p hysterectomy, hyperlipidemia on Crestor, and hypertension on metoprolol. She feels well and has no acute complaints today. \par

## 2019-11-03 ENCOUNTER — RX RENEWAL (OUTPATIENT)
Age: 69
End: 2019-11-03

## 2019-11-18 ENCOUNTER — APPOINTMENT (OUTPATIENT)
Dept: INTERNAL MEDICINE | Facility: CLINIC | Age: 69
End: 2019-11-18
Payer: MEDICARE

## 2019-11-18 PROCEDURE — 90662 IIV NO PRSV INCREASED AG IM: CPT

## 2019-11-18 PROCEDURE — G0008: CPT

## 2020-01-27 ENCOUNTER — RX RENEWAL (OUTPATIENT)
Age: 70
End: 2020-01-27

## 2020-01-29 ENCOUNTER — FORM ENCOUNTER (OUTPATIENT)
Age: 70
End: 2020-01-29

## 2020-01-30 ENCOUNTER — APPOINTMENT (OUTPATIENT)
Dept: CT IMAGING | Facility: CLINIC | Age: 70
End: 2020-01-30
Payer: MEDICARE

## 2020-01-30 ENCOUNTER — OUTPATIENT (OUTPATIENT)
Dept: OUTPATIENT SERVICES | Facility: HOSPITAL | Age: 70
LOS: 1 days | End: 2020-01-30
Payer: MEDICARE

## 2020-01-30 DIAGNOSIS — Z90.710 ACQUIRED ABSENCE OF BOTH CERVIX AND UTERUS: Chronic | ICD-10-CM

## 2020-01-30 DIAGNOSIS — Z98.890 OTHER SPECIFIED POSTPROCEDURAL STATES: Chronic | ICD-10-CM

## 2020-01-30 DIAGNOSIS — Z98.49 CATARACT EXTRACTION STATUS, UNSPECIFIED EYE: Chronic | ICD-10-CM

## 2020-01-30 DIAGNOSIS — C21.0 MALIGNANT NEOPLASM OF ANUS, UNSPECIFIED: ICD-10-CM

## 2020-01-30 DIAGNOSIS — Z90.89 ACQUIRED ABSENCE OF OTHER ORGANS: Chronic | ICD-10-CM

## 2020-01-30 DIAGNOSIS — Z00.00 ENCOUNTER FOR GENERAL ADULT MEDICAL EXAMINATION WITHOUT ABNORMAL FINDINGS: ICD-10-CM

## 2020-01-30 PROCEDURE — 71260 CT THORAX DX C+: CPT | Mod: 26

## 2020-01-30 PROCEDURE — 71260 CT THORAX DX C+: CPT

## 2020-01-30 PROCEDURE — 74177 CT ABD & PELVIS W/CONTRAST: CPT | Mod: 26

## 2020-01-30 PROCEDURE — 74177 CT ABD & PELVIS W/CONTRAST: CPT

## 2020-01-30 PROCEDURE — 82565 ASSAY OF CREATININE: CPT

## 2020-02-05 ENCOUNTER — APPOINTMENT (OUTPATIENT)
Dept: FAMILY MEDICINE | Facility: CLINIC | Age: 70
End: 2020-02-05
Payer: MEDICARE

## 2020-02-05 VITALS
HEART RATE: 76 BPM | TEMPERATURE: 98 F | BODY MASS INDEX: 22.51 KG/M2 | DIASTOLIC BLOOD PRESSURE: 71 MMHG | HEIGHT: 69 IN | SYSTOLIC BLOOD PRESSURE: 134 MMHG | RESPIRATION RATE: 16 BRPM | WEIGHT: 152 LBS

## 2020-02-05 DIAGNOSIS — K62.5 HEMORRHAGE OF ANUS AND RECTUM: ICD-10-CM

## 2020-02-05 DIAGNOSIS — Z29.8 ENCOUNTER FOR OTHER SPECIFIED PROPHYLACTIC MEASURES: ICD-10-CM

## 2020-02-05 DIAGNOSIS — F41.1 GENERALIZED ANXIETY DISORDER: ICD-10-CM

## 2020-02-05 DIAGNOSIS — Z87.898 PERSONAL HISTORY OF OTHER SPECIFIED CONDITIONS: ICD-10-CM

## 2020-02-05 DIAGNOSIS — K52.1 TOXIC GASTROENTERITIS AND COLITIS: ICD-10-CM

## 2020-02-05 DIAGNOSIS — Z87.19 PERSONAL HISTORY OF OTHER DISEASES OF THE DIGESTIVE SYSTEM: ICD-10-CM

## 2020-02-05 DIAGNOSIS — Z23 ENCOUNTER FOR IMMUNIZATION: ICD-10-CM

## 2020-02-05 DIAGNOSIS — Z13.820 ENCOUNTER FOR SCREENING FOR OSTEOPOROSIS: ICD-10-CM

## 2020-02-05 DIAGNOSIS — K62.9 DISEASE OF ANUS AND RECTUM, UNSPECIFIED: ICD-10-CM

## 2020-02-05 DIAGNOSIS — R59.0 LOCALIZED ENLARGED LYMPH NODES: ICD-10-CM

## 2020-02-05 DIAGNOSIS — Z01.818 ENCOUNTER FOR OTHER PREPROCEDURAL EXAMINATION: ICD-10-CM

## 2020-02-05 DIAGNOSIS — Z92.29 PERSONAL HISTORY OF OTHER DRUG THERAPY: ICD-10-CM

## 2020-02-05 PROCEDURE — 99203 OFFICE O/P NEW LOW 30 MIN: CPT

## 2020-02-05 NOTE — ASSESSMENT
[FreeTextEntry1] : This is a 70 y/o female with PMHx significant for Anal Cancer s/p chemo / RT completed 6/17, HTN, Carotid stenosis, HLD, Macrocytic anemia, SCC presenting to the office to establish as a new patient in the practice and requesting to have medications refilled.\par \par CVS: h/o HTN, HLD\par -Blood pressure under control, continue Metoprolol ER 50 mg daily, Crestor 10 mg .\par -Diet and exercise discussed.\par \par ONC/GI/COLOREC: h/o anal CA s/p Rad / Chemo completed 6/17\par -Curently on remission.\par -Pt followed by Dr Rosario, next appt 2/12\par \par HCM: \par -Referral for mammogram given\par -Referral for Bone density given\par -Flu vaccine 11/19\par -PPSV 3/2016\par -PCV 10/18\par -TDap 07/19\par -RTO for CPE 08/20\par \par

## 2020-02-05 NOTE — COUNSELING
[Fall prevention counseling provided] : Fall prevention counseling provided [AUDIT-C Screening administered and reviewed] : AUDIT-C Screening administered and reviewed [None] : None [Good understanding] : Patient has a good understanding of lifestyle changes and steps needed to achieve self management goal

## 2020-02-05 NOTE — HISTORY OF PRESENT ILLNESS
[Spouse] : spouse [FreeTextEntry8] : This is a 70 y/o female with PMHx significant for Anal Cancer s/p chemo / RT completed 6/17, HTN, Carotid stenosis, HLD, Macrocytic anemia, SCC presenting to the office to establish as a new patient in the practice and requesting to have medications refills. Pt offers no acute complains at this time.

## 2020-02-05 NOTE — HEALTH RISK ASSESSMENT
[Yes] : Yes [Monthly or less (1 pt)] : Monthly or less (1 point) [1 or 2 (0 pts)] : 1 or 2 (0 points) [Never (0 pts)] : Never (0 points) [No falls in past year] : Patient reported no falls in the past year [No] : In the past 12 months have you used drugs other than those required for medical reasons? No [0] : 1) Little interest or pleasure doing things: Not at all (0) [Designated Healthcare Proxy] : Designated healthcare proxy [With Patient/Caregiver] : With Patient/Caregiver [Name: ___] : Health Care Proxy's Name: [unfilled]  [Relationship: ___] : Relationship: [unfilled] [] : No [de-identified] : none [de-identified] : rarely [de-identified] : Dr. Campbell - GYN, Dr. East - Cardiologist, Dr. Sanchez - surgeon, Dr. Rosario - [place], Dr. Franklin - ENT, dermatologist in [place] [Audit-CScore] : 1 [de-identified] : pretty good [[id number]] : 0 [de-identified] : walking, yoga [AdvancecareDate] : 02/20

## 2020-02-11 ENCOUNTER — OUTPATIENT (OUTPATIENT)
Dept: OUTPATIENT SERVICES | Facility: HOSPITAL | Age: 70
LOS: 1 days | Discharge: HOME | End: 2020-02-11

## 2020-02-11 DIAGNOSIS — Z90.710 ACQUIRED ABSENCE OF BOTH CERVIX AND UTERUS: Chronic | ICD-10-CM

## 2020-02-11 DIAGNOSIS — Z98.890 OTHER SPECIFIED POSTPROCEDURAL STATES: Chronic | ICD-10-CM

## 2020-02-11 DIAGNOSIS — Z90.89 ACQUIRED ABSENCE OF OTHER ORGANS: Chronic | ICD-10-CM

## 2020-02-11 DIAGNOSIS — C21.0 MALIGNANT NEOPLASM OF ANUS, UNSPECIFIED: ICD-10-CM

## 2020-02-11 DIAGNOSIS — Z98.49 CATARACT EXTRACTION STATUS, UNSPECIFIED EYE: Chronic | ICD-10-CM

## 2020-02-12 ENCOUNTER — LABORATORY RESULT (OUTPATIENT)
Age: 70
End: 2020-02-12

## 2020-02-12 ENCOUNTER — RESULT REVIEW (OUTPATIENT)
Age: 70
End: 2020-02-12

## 2020-02-12 ENCOUNTER — APPOINTMENT (OUTPATIENT)
Dept: HEMATOLOGY ONCOLOGY | Facility: CLINIC | Age: 70
End: 2020-02-12
Payer: MEDICARE

## 2020-02-12 VITALS
OXYGEN SATURATION: 97 % | TEMPERATURE: 98.6 F | HEIGHT: 69 IN | BODY MASS INDEX: 22.51 KG/M2 | DIASTOLIC BLOOD PRESSURE: 75 MMHG | SYSTOLIC BLOOD PRESSURE: 126 MMHG | WEIGHT: 152 LBS | HEART RATE: 90 BPM

## 2020-02-12 LAB
BASOPHILS # BLD AUTO: 0 K/UL — SIGNIFICANT CHANGE UP (ref 0–0.2)
BASOPHILS NFR BLD AUTO: 1 % — SIGNIFICANT CHANGE UP (ref 0–2)
EOSINOPHIL # BLD AUTO: 0.2 K/UL — SIGNIFICANT CHANGE UP (ref 0–0.5)
EOSINOPHIL NFR BLD AUTO: 4.7 % — SIGNIFICANT CHANGE UP (ref 0–6)
HCT VFR BLD CALC: 41.3 % — SIGNIFICANT CHANGE UP (ref 34.5–45)
HGB BLD-MCNC: 13 G/DL — SIGNIFICANT CHANGE UP (ref 11.5–15.5)
LYMPHOCYTES # BLD AUTO: 0.8 K/UL — LOW (ref 1–3.3)
LYMPHOCYTES # BLD AUTO: 22.5 % — SIGNIFICANT CHANGE UP (ref 13–44)
MCHC RBC-ENTMCNC: 31.5 G/DL — LOW (ref 32–36)
MCHC RBC-ENTMCNC: 32 PG — SIGNIFICANT CHANGE UP (ref 27–34)
MCV RBC AUTO: 101.6 FL — HIGH (ref 80–100)
MONOCYTES # BLD AUTO: 0.3 K/UL — SIGNIFICANT CHANGE UP (ref 0–0.9)
MONOCYTES NFR BLD AUTO: 7.7 % — SIGNIFICANT CHANGE UP (ref 2–14)
NEUTROPHILS # BLD AUTO: 2.4 K/UL — SIGNIFICANT CHANGE UP (ref 1.8–7.4)
NEUTROPHILS NFR BLD AUTO: 64.1 % — SIGNIFICANT CHANGE UP (ref 43–77)
PLATELET # BLD AUTO: 201 K/UL — SIGNIFICANT CHANGE UP (ref 150–400)
RBC # BLD: 4.06 M/UL — SIGNIFICANT CHANGE UP (ref 3.8–5.2)
RBC # FLD: 11.6 % — SIGNIFICANT CHANGE UP (ref 10.3–14.5)
WBC # BLD: 3.8 K/UL — SIGNIFICANT CHANGE UP (ref 3.8–10.5)
WBC # FLD AUTO: 3.8 K/UL — SIGNIFICANT CHANGE UP (ref 3.8–10.5)

## 2020-02-12 PROCEDURE — 99215 OFFICE O/P EST HI 40 MIN: CPT

## 2020-02-12 NOTE — RESULTS/DATA
[FreeTextEntry1] : 1/30/2020 CT C/A/P: Stable small lung nodules. No adenopathy in the thorax, abdomen or pelvis.\par \par 8/2/18 CT A/P:  No evidence of metastatic disease in the abdomen or pelvis. \par \par 4/3/18 CT Chest:  stable few small ( less than 4 mm) solid nodules in both lungs.  No new nodules.  \par \par 1/11/18 CT CAP: Biapical scarring, unchanged. A 4 mm nodule in the left upper lobe is unchanged. A 2 mm nodule in the right upper lobe is new from 2/15/2017. A 2 mm nodule in the right lower lobe opacity, unchanged. 5 mm nodule left lower lobe is unchanged. Nodular thickening of the posterior perianal region is grossly unchanged. Previously noted prominent perirectal lymph nodes in the mesorectal fat are resolved.There is mild perirectal fat stranding, likely secondary to post radiation changes. Colonic diverticulosis. Previously seen enlarged lymph node in the right inguinal region is no longer visualized. \par \par 3/24/17 Colonoscopy:  T3 anal mass with 4 LN that  were 3-10 mm.  \par \par 3/13/17 MRI of abdomen/Pelvis:  LIVER: The 1 cm indeterminant lesion on the prior CT scan corresponds to a simple cyst with thin septation in segment IVb. There is also a 3 mm cyst in segment 7.  Pelvic MRI:  TUMOR LOCATION (Quadrant): Anorectal junction, posterior wall. DISTANCE OF MOST INFERIOR PORTION OF LESION TO THE ANAL VERGE: 2-3cm.  CRANIOCAUDAL EXTENT FROM MOST INFERIOR PORTION OF THE TUMOR: 2.4 cm.  TUMOR CHARACTERISTICS: Semiannular enhancing lesion.  TUMOR SIZE (AP x Transverse x Craniocaudal): 2.8 x 2 cm.  Involvement of the superior internal sphincter and puborectalis sling posteriorly.\par T CATEGORY: T2.  REGIONAL LYMPH NODES (suspicious = irregular border, mixed signal intensity and/or >5mm): Multiple perirectal (approximately 4) the largest 9 mm left superior presacral. Bilateral internal iliac acute 7 mm. Necrotic right inguinal 1.5 x 2.6 cm. All nodes are stable.\par \par 3/6/17 Anal biopsy:  BIOPSIES (ANAL MASS): SQUAMOUS CELL CARCINOMA, IN REGIONS, POORLY DIFFERENTIATED, EXTENSIVELY INVASIVE.\par - FOCI OF BASALOID DIFFERENTIATION PRESENT.\par - NO DEFINITIVE LYMPHOVASCULAR INVASION.\par - TUMOR UNDERMINES RECTAL MUCOSA.\par BIOPSY (ANAL MASS, IVONNE-CUT):\par - A FEW FOCI CONSISTENT WITH SQUAMOUS CELL CARCINOMA\par BIOPSY (PERIANAL SKIN):\par - NEGATIVE FOR MALIGNANCY\par - DIATATED FOLLICLE WITH ASSOCIATED DERMAL FIBROSIS.\par \par PET/CT 3/2/17:  Right inguinal lymph node 2.5 x 2.0 cm (SUV 5.4;Right internal iliac lymph node, better delineated on CT (SUV4.9). Multiple FDG avid lymph nodes, better seen on CT. For example left perirectal lymph node, SUV 3.1 lymph node along the left rectal wall, SUV 11.8 presacral/perisigmoid lymph node, 1.0 x 0.9 cm (SUV 5.4)\par BOWEL/PERITONEUM/MESENTERY: FDG avid rectal lesion, poorly delineated on CT due to lack of oral contrast (SUV 11.8). Infiltration of the\par perirectal fat.\par \par 2/23/17 Anal biopsy:  Pathology negative for malignancy.  Mild,non-specific inflammation.  \par \par 2/15/17 CT Neck/Chest/Abd/Pelvis:  Mild asymmetric nonspecific enlargement of the left palatine tonsil, and mild nonspecific enlargement of lingual tonsils at the tongue base.  There is a 4 mm nodule in the right lobe of the thyroid gland.  LIVER: There is a 1.0 cm indeterminant lesion in the medial segment.  BOWEL: There is a nodular appearance to the posterior perianal region and focal thickening involving the anorectal region. There are prominent perirectal lymph nodes in the mesorectal fat measuring up to 9 mm., anorectal lesion should be considered.  RETROPERITONEUM: there is a nonenlarged right internal iliac chain lymph node measuring 0.9 cm with an abnormal morphology.  ABDOMINAL WALL: There is an abnormal appearing right inguinal lymph node which contains both solid and cystic components measuring 2.9 x 2.1 cm \par \par 2/13/17 B/L Mammography ([facility]) : Normal \par \par 1/31/17 LYMPH NODE, GROIN, RIGHT, FNA:  SUSPICIOUS FOR MALIGNANCY.\par \par 1/31/17 Sonography of the right groin was performed again demonstrating the 3.1 x 1.7 x 2.4 cm cystic and solid mass which was selected for biopsy.\par

## 2020-02-12 NOTE — HISTORY OF PRESENT ILLNESS
[de-identified] : The patient was diagnosed with SCC of the anus in January of 2017 at the age of 66.  The patient initially reported an enlarged lymph node in her right inguinal region and went to her gynecologist for evaluation.  Following an ultrasound of the right inguinal lymph node, an FNA on 1/31/2017 revealed atypical squamous cells, cystic, "suspicious for squamous cell carcinoma".  Patient reports she has not had a pap test since age 63.  Her last mammogram was about 3 years ago, and she has not seen a dermatologist.  Pt. also reports recent bright red blood per rectum along with a change in bowel habits for a short time. Denies any vaginal bleeding or discharge, abdominal pain.  [de-identified] : Social: Never a smoker. [de-identified] : Patient presents today s/p CRT with 5FU and MMC completed on 6/1/17 and is ~ 2.5 years out from treatment. She notes that she is doing very well and has a good appetite, weight stable.  She denies abdominal pain, melena, hematochezia, BPBRP, dyschezia, neuropathy, or any recent problems or surgeries.  No other new complaints today.

## 2020-02-12 NOTE — PHYSICAL EXAM
[Restricted in physically strenuous activity but ambulatory and able to carry out work of a light or sedentary nature] : Status 1- Restricted in physically strenuous activity but ambulatory and able to carry out work of a light or sedentary nature, e.g., light house work, office work [Normal] : affect appropriate [FreeTextEntry1] : +erythema with healing skin (improved compared to the last visit) [de-identified] : lower mid lip [de-identified] : Right inguinal lymphadenopathy ~3x2 cm, mobile.

## 2020-02-13 LAB
ALBUMIN SERPL ELPH-MCNC: 4.3 G/DL
ALP BLD-CCNC: 75 U/L
ALT SERPL-CCNC: 19 U/L
ANION GAP SERPL CALC-SCNC: 14 MMOL/L
AST SERPL-CCNC: 23 U/L
BILIRUB SERPL-MCNC: 0.2 MG/DL
BUN SERPL-MCNC: 21 MG/DL
CALCIUM SERPL-MCNC: 9.5 MG/DL
CEA SERPL-MCNC: 2.2 NG/ML
CHLORIDE SERPL-SCNC: 104 MMOL/L
CO2 SERPL-SCNC: 23 MMOL/L
CREAT SERPL-MCNC: 0.88 MG/DL
GLUCOSE SERPL-MCNC: 114 MG/DL
MAGNESIUM SERPL-MCNC: 2.2 MG/DL
POTASSIUM SERPL-SCNC: 4.2 MMOL/L
PROT SERPL-MCNC: 6.4 G/DL
SODIUM SERPL-SCNC: 140 MMOL/L

## 2020-03-03 ENCOUNTER — APPOINTMENT (OUTPATIENT)
Dept: COLORECTAL SURGERY | Facility: CLINIC | Age: 70
End: 2020-03-03
Payer: MEDICARE

## 2020-03-03 VITALS
WEIGHT: 152 LBS | BODY MASS INDEX: 22.51 KG/M2 | HEIGHT: 69 IN | SYSTOLIC BLOOD PRESSURE: 129 MMHG | HEART RATE: 66 BPM | RESPIRATION RATE: 16 BRPM | TEMPERATURE: 97.9 F | DIASTOLIC BLOOD PRESSURE: 70 MMHG

## 2020-03-03 PROCEDURE — 99214 OFFICE O/P EST MOD 30 MIN: CPT | Mod: 25

## 2020-03-03 PROCEDURE — 46600 DIAGNOSTIC ANOSCOPY SPX: CPT

## 2020-03-03 NOTE — PHYSICAL EXAM
[Normal rectal exam] : exam was normal [Skin Tags] : residual hemorrhoidal skin tags were noted [Nonprolapsing] : a nonprolapsing (grade I) [Normal] : was normal [Stool Sample Taken] : no stool obtained on rectal exam [None] : there was no rectal abscess [Occult Blood Positive] : was negative for occult blood [Fecal Impaction] : no fecal impaction was present [Gross Blood] : no gross blood [de-identified] : Chronic changes of perianal skin secondary to radiation [Calm] : calm [de-identified] : NAD [de-identified] : no obvious scar [de-identified] : NCAT [de-identified] : A&O x 3 [de-identified] : OSEGUERA x 4

## 2020-03-03 NOTE — HISTORY OF PRESENT ILLNESS
[FreeTextEntry1] : Ms. Amin presents to the office for followup after completing CRT on 6/10/17 for a T1N2M0 anal SCC that was originally diagnosed in 3//2017. She continues to report 2 well formed BMs on a daily basis without associated pain, bleeding or incontinent episodes. Last colonoscopy 3/24/18 without e/o recurrence.\par \par 3/3/20 No complaints. Passing stools ~ 3 times daily. No bleeding or pain.\par

## 2020-03-03 NOTE — ASSESSMENT
[FreeTextEntry1] : Ms. Amin presents to the office for followup of her h/o anal SCC. She finished chemoradiation treatment 6/2017 . She denies any anal pain, rectal bleeding, or fecal incontinent episodes. She passes three well-formed but soft movements on a daily basis every morning. She has been active and feels much improved.\par ERIKA and anoscopy in office today were fairly unremarkable. I have reassured her of this and will see her back in  1 yr for repeat anoscopy.\par Ongoing surveillance per Dr. Rosario.

## 2020-04-27 ENCOUNTER — RX RENEWAL (OUTPATIENT)
Age: 70
End: 2020-04-27

## 2020-05-18 ENCOUNTER — RX RENEWAL (OUTPATIENT)
Age: 70
End: 2020-05-18

## 2020-08-11 ENCOUNTER — RX RENEWAL (OUTPATIENT)
Age: 70
End: 2020-08-11

## 2020-08-25 ENCOUNTER — RX RENEWAL (OUTPATIENT)
Age: 70
End: 2020-08-25

## 2020-12-14 ENCOUNTER — RX RENEWAL (OUTPATIENT)
Age: 70
End: 2020-12-14

## 2021-03-13 ENCOUNTER — OUTPATIENT (OUTPATIENT)
Dept: OUTPATIENT SERVICES | Facility: HOSPITAL | Age: 71
LOS: 1 days | End: 2021-03-13
Payer: MEDICARE

## 2021-03-13 ENCOUNTER — APPOINTMENT (OUTPATIENT)
Dept: CT IMAGING | Facility: CLINIC | Age: 71
End: 2021-03-13
Payer: MEDICARE

## 2021-03-13 ENCOUNTER — RESULT REVIEW (OUTPATIENT)
Age: 71
End: 2021-03-13

## 2021-03-13 DIAGNOSIS — Z98.890 OTHER SPECIFIED POSTPROCEDURAL STATES: Chronic | ICD-10-CM

## 2021-03-13 DIAGNOSIS — Z98.49 CATARACT EXTRACTION STATUS, UNSPECIFIED EYE: Chronic | ICD-10-CM

## 2021-03-13 DIAGNOSIS — Z90.89 ACQUIRED ABSENCE OF OTHER ORGANS: Chronic | ICD-10-CM

## 2021-03-13 DIAGNOSIS — Z90.710 ACQUIRED ABSENCE OF BOTH CERVIX AND UTERUS: Chronic | ICD-10-CM

## 2021-03-13 DIAGNOSIS — C21.0 MALIGNANT NEOPLASM OF ANUS, UNSPECIFIED: ICD-10-CM

## 2021-03-13 PROCEDURE — G1004: CPT

## 2021-03-13 PROCEDURE — 71260 CT THORAX DX C+: CPT

## 2021-03-13 PROCEDURE — 74177 CT ABD & PELVIS W/CONTRAST: CPT

## 2021-03-13 PROCEDURE — 74177 CT ABD & PELVIS W/CONTRAST: CPT | Mod: 26,MG

## 2021-03-13 PROCEDURE — 71260 CT THORAX DX C+: CPT | Mod: 26,MG

## 2021-03-22 ENCOUNTER — OUTPATIENT (OUTPATIENT)
Dept: OUTPATIENT SERVICES | Facility: HOSPITAL | Age: 71
LOS: 1 days | Discharge: HOME | End: 2021-03-22

## 2021-03-22 DIAGNOSIS — Z98.890 OTHER SPECIFIED POSTPROCEDURAL STATES: Chronic | ICD-10-CM

## 2021-03-22 DIAGNOSIS — Z98.49 CATARACT EXTRACTION STATUS, UNSPECIFIED EYE: Chronic | ICD-10-CM

## 2021-03-22 DIAGNOSIS — Z90.710 ACQUIRED ABSENCE OF BOTH CERVIX AND UTERUS: Chronic | ICD-10-CM

## 2021-03-22 DIAGNOSIS — Z90.89 ACQUIRED ABSENCE OF OTHER ORGANS: Chronic | ICD-10-CM

## 2021-03-22 DIAGNOSIS — C21.0 MALIGNANT NEOPLASM OF ANUS, UNSPECIFIED: ICD-10-CM

## 2021-04-14 ENCOUNTER — RESULT REVIEW (OUTPATIENT)
Age: 71
End: 2021-04-14

## 2021-04-14 ENCOUNTER — OUTPATIENT (OUTPATIENT)
Dept: OUTPATIENT SERVICES | Facility: HOSPITAL | Age: 71
LOS: 1 days | Discharge: HOME | End: 2021-04-14

## 2021-04-14 ENCOUNTER — APPOINTMENT (OUTPATIENT)
Dept: HEMATOLOGY ONCOLOGY | Facility: CLINIC | Age: 71
End: 2021-04-14
Payer: MEDICARE

## 2021-04-14 VITALS
HEART RATE: 92 BPM | HEIGHT: 69 IN | RESPIRATION RATE: 16 BRPM | DIASTOLIC BLOOD PRESSURE: 75 MMHG | SYSTOLIC BLOOD PRESSURE: 165 MMHG | TEMPERATURE: 98 F | WEIGHT: 142 LBS | BODY MASS INDEX: 21.03 KG/M2

## 2021-04-14 DIAGNOSIS — Z98.890 OTHER SPECIFIED POSTPROCEDURAL STATES: Chronic | ICD-10-CM

## 2021-04-14 DIAGNOSIS — Z90.710 ACQUIRED ABSENCE OF BOTH CERVIX AND UTERUS: Chronic | ICD-10-CM

## 2021-04-14 DIAGNOSIS — Z90.89 ACQUIRED ABSENCE OF OTHER ORGANS: Chronic | ICD-10-CM

## 2021-04-14 DIAGNOSIS — C21.0 MALIGNANT NEOPLASM OF ANUS, UNSPECIFIED: ICD-10-CM

## 2021-04-14 DIAGNOSIS — Z98.49 CATARACT EXTRACTION STATUS, UNSPECIFIED EYE: Chronic | ICD-10-CM

## 2021-04-14 LAB
ALBUMIN SERPL ELPH-MCNC: 4.5 G/DL — SIGNIFICANT CHANGE UP (ref 3.3–5)
ALP SERPL-CCNC: 79 U/L — SIGNIFICANT CHANGE UP (ref 40–120)
ALT FLD-CCNC: 14 U/L — SIGNIFICANT CHANGE UP (ref 10–45)
ANION GAP SERPL CALC-SCNC: 13 MMOL/L — SIGNIFICANT CHANGE UP (ref 5–17)
AST SERPL-CCNC: 19 U/L — SIGNIFICANT CHANGE UP (ref 10–40)
BASOPHILS # BLD AUTO: 0.03 K/UL — SIGNIFICANT CHANGE UP (ref 0–0.2)
BASOPHILS NFR BLD AUTO: 0.7 % — SIGNIFICANT CHANGE UP (ref 0–2)
BILIRUB SERPL-MCNC: 0.2 MG/DL — SIGNIFICANT CHANGE UP (ref 0.2–1.2)
BUN SERPL-MCNC: 23 MG/DL — SIGNIFICANT CHANGE UP (ref 7–23)
CALCIUM SERPL-MCNC: 9.8 MG/DL — SIGNIFICANT CHANGE UP (ref 8.4–10.5)
CEA SERPL-MCNC: 3.3 NG/ML — SIGNIFICANT CHANGE UP (ref 0–3.8)
CHLORIDE SERPL-SCNC: 101 MMOL/L — SIGNIFICANT CHANGE UP (ref 96–108)
CO2 SERPL-SCNC: 25 MMOL/L — SIGNIFICANT CHANGE UP (ref 22–31)
CREAT SERPL-MCNC: 0.92 MG/DL — SIGNIFICANT CHANGE UP (ref 0.5–1.3)
EOSINOPHIL # BLD AUTO: 0.2 K/UL — SIGNIFICANT CHANGE UP (ref 0–0.5)
EOSINOPHIL NFR BLD AUTO: 4.7 % — SIGNIFICANT CHANGE UP (ref 0–6)
GLUCOSE SERPL-MCNC: 85 MG/DL — SIGNIFICANT CHANGE UP (ref 70–99)
HCT VFR BLD CALC: 40.6 % — SIGNIFICANT CHANGE UP (ref 34.5–45)
HGB BLD-MCNC: 13 G/DL — SIGNIFICANT CHANGE UP (ref 11.5–15.5)
IMM GRANULOCYTES NFR BLD AUTO: 0 % — SIGNIFICANT CHANGE UP (ref 0–1.5)
LYMPHOCYTES # BLD AUTO: 0.76 K/UL — LOW (ref 1–3.3)
LYMPHOCYTES # BLD AUTO: 17.9 % — SIGNIFICANT CHANGE UP (ref 13–44)
MAGNESIUM SERPL-MCNC: 2.3 MG/DL — SIGNIFICANT CHANGE UP (ref 1.6–2.6)
MCHC RBC-ENTMCNC: 32 GM/DL — SIGNIFICANT CHANGE UP (ref 32–36)
MCHC RBC-ENTMCNC: 32.2 PG — SIGNIFICANT CHANGE UP (ref 27–34)
MCV RBC AUTO: 100.5 FL — HIGH (ref 80–100)
MONOCYTES # BLD AUTO: 0.28 K/UL — SIGNIFICANT CHANGE UP (ref 0–0.9)
MONOCYTES NFR BLD AUTO: 6.6 % — SIGNIFICANT CHANGE UP (ref 2–14)
NEUTROPHILS # BLD AUTO: 2.98 K/UL — SIGNIFICANT CHANGE UP (ref 1.8–7.4)
NEUTROPHILS NFR BLD AUTO: 70.1 % — SIGNIFICANT CHANGE UP (ref 43–77)
NRBC # BLD: 0 /100 WBCS — SIGNIFICANT CHANGE UP (ref 0–0)
PLATELET # BLD AUTO: 184 K/UL — SIGNIFICANT CHANGE UP (ref 150–400)
POTASSIUM SERPL-MCNC: 4.7 MMOL/L — SIGNIFICANT CHANGE UP (ref 3.5–5.3)
POTASSIUM SERPL-SCNC: 4.7 MMOL/L — SIGNIFICANT CHANGE UP (ref 3.5–5.3)
PROT SERPL-MCNC: 6.9 G/DL — SIGNIFICANT CHANGE UP (ref 6–8.3)
RBC # BLD: 4.04 M/UL — SIGNIFICANT CHANGE UP (ref 3.8–5.2)
RBC # FLD: 12.7 % — SIGNIFICANT CHANGE UP (ref 10.3–14.5)
SODIUM SERPL-SCNC: 140 MMOL/L — SIGNIFICANT CHANGE UP (ref 135–145)
WBC # BLD: 4.25 K/UL — SIGNIFICANT CHANGE UP (ref 3.8–10.5)
WBC # FLD AUTO: 4.25 K/UL — SIGNIFICANT CHANGE UP (ref 3.8–10.5)

## 2021-04-14 PROCEDURE — 99215 OFFICE O/P EST HI 40 MIN: CPT

## 2021-04-14 NOTE — RESULTS/DATA
[FreeTextEntry1] : 3/13/21 CT CAP:  Stable exam compared with January 30, 2020.  No evidence of metastatic disease\par \par 1/30/2020 CT C/A/P: Stable small lung nodules. No adenopathy in the thorax, abdomen or pelvis.\par \par 8/2/18 CT A/P:  No evidence of metastatic disease in the abdomen or pelvis. \par \par 4/3/18 CT Chest:  stable few small ( less than 4 mm) solid nodules in both lungs.  No new nodules.  \par \par 1/11/18 CT CAP: Biapical scarring, unchanged. A 4 mm nodule in the left upper lobe is unchanged. A 2 mm nodule in the right upper lobe is new from 2/15/2017. A 2 mm nodule in the right lower lobe opacity, unchanged. 5 mm nodule left lower lobe is unchanged. Nodular thickening of the posterior perianal region is grossly unchanged. Previously noted prominent perirectal lymph nodes in the mesorectal fat are resolved.There is mild perirectal fat stranding, likely secondary to post radiation changes. Colonic diverticulosis. Previously seen enlarged lymph node in the right inguinal region is no longer visualized. \par \par 3/24/17 Colonoscopy:  T3 anal mass with 4 LN that  were 3-10 mm.  \par \par 3/13/17 MRI of abdomen/Pelvis:  LIVER: The 1 cm indeterminant lesion on the prior CT scan corresponds to a simple cyst with thin septation in segment IVb. There is also a 3 mm cyst in segment 7.  Pelvic MRI:  TUMOR LOCATION (Quadrant): Anorectal junction, posterior wall. DISTANCE OF MOST INFERIOR PORTION OF LESION TO THE ANAL VERGE: 2-3cm.  CRANIOCAUDAL EXTENT FROM MOST INFERIOR PORTION OF THE TUMOR: 2.4 cm.  TUMOR CHARACTERISTICS: Semiannular enhancing lesion.  TUMOR SIZE (AP x Transverse x Craniocaudal): 2.8 x 2 cm.  Involvement of the superior internal sphincter and puborectalis sling posteriorly.\par T CATEGORY: T2.  REGIONAL LYMPH NODES (suspicious = irregular border, mixed signal intensity and/or >5mm): Multiple perirectal (approximately 4) the largest 9 mm left superior presacral. Bilateral internal iliac acute 7 mm. Necrotic right inguinal 1.5 x 2.6 cm. All nodes are stable.\par \par 3/6/17 Anal biopsy:  BIOPSIES (ANAL MASS): SQUAMOUS CELL CARCINOMA, IN REGIONS, POORLY DIFFERENTIATED, EXTENSIVELY INVASIVE.\par - FOCI OF BASALOID DIFFERENTIATION PRESENT.\par - NO DEFINITIVE LYMPHOVASCULAR INVASION.\par - TUMOR UNDERMINES RECTAL MUCOSA.\par BIOPSY (ANAL MASS, IVONNE-CUT):\par - A FEW FOCI CONSISTENT WITH SQUAMOUS CELL CARCINOMA\par BIOPSY (PERIANAL SKIN):\par - NEGATIVE FOR MALIGNANCY\par - DIATATED FOLLICLE WITH ASSOCIATED DERMAL FIBROSIS.\par \par PET/CT 3/2/17:  Right inguinal lymph node 2.5 x 2.0 cm (SUV 5.4;Right internal iliac lymph node, better delineated on CT (SUV4.9). Multiple FDG avid lymph nodes, better seen on CT. For example left perirectal lymph node, SUV 3.1 lymph node along the left rectal wall, SUV 11.8 presacral/perisigmoid lymph node, 1.0 x 0.9 cm (SUV 5.4)\par BOWEL/PERITONEUM/MESENTERY: FDG avid rectal lesion, poorly delineated on CT due to lack of oral contrast (SUV 11.8). Infiltration of the\par perirectal fat.\par \par 2/23/17 Anal biopsy:  Pathology negative for malignancy.  Mild,non-specific inflammation.  \par \par 2/15/17 CT Neck/Chest/Abd/Pelvis:  Mild asymmetric nonspecific enlargement of the left palatine tonsil, and mild nonspecific enlargement of lingual tonsils at the tongue base.  There is a 4 mm nodule in the right lobe of the thyroid gland.  LIVER: There is a 1.0 cm indeterminant lesion in the medial segment.  BOWEL: There is a nodular appearance to the posterior perianal region and focal thickening involving the anorectal region. There are prominent perirectal lymph nodes in the mesorectal fat measuring up to 9 mm., anorectal lesion should be considered.  RETROPERITONEUM: there is a nonenlarged right internal iliac chain lymph node measuring 0.9 cm with an abnormal morphology.  ABDOMINAL WALL: There is an abnormal appearing right inguinal lymph node which contains both solid and cystic components measuring 2.9 x 2.1 cm \par \par 2/13/17 B/L Mammography ([facility]) : Normal \par \par 1/31/17 LYMPH NODE, GROIN, RIGHT, FNA:  SUSPICIOUS FOR MALIGNANCY.\par \par 1/31/17 Sonography of the right groin was performed again demonstrating the 3.1 x 1.7 x 2.4 cm cystic and solid mass which was selected for biopsy.\par

## 2021-04-14 NOTE — HISTORY OF PRESENT ILLNESS
[de-identified] : The patient was diagnosed with SCC of the anus in January of 2017 at the age of 66.  The patient initially reported an enlarged lymph node in her right inguinal region and went to her gynecologist for evaluation.  Following an ultrasound of the right inguinal lymph node, an FNA on 1/31/2017 revealed atypical squamous cells, cystic, "suspicious for squamous cell carcinoma".  Patient reports she has not had a pap test since age 63.  Her last mammogram was about 3 years ago, and she has not seen a dermatologist.  Pt. also reports recent bright red blood per rectum along with a change in bowel habits for a short time. Denies any vaginal bleeding or discharge, abdominal pain.  [de-identified] : Social: Never a smoker. [de-identified] : Patient presents today s/p CRT with 5FU and MMC completed on 6/1/17 and is ~ 2.5 years out from treatment. She notes that she is doing very well and has a good appetite, weight stable.  She denies abdominal pain, melena, hematochezia, BPBRP, dyschezia, neuropathy, or any recent problems or surgeries.  No other new complaints today.

## 2021-04-14 NOTE — PHYSICAL EXAM
[Restricted in physically strenuous activity but ambulatory and able to carry out work of a light or sedentary nature] : Status 1- Restricted in physically strenuous activity but ambulatory and able to carry out work of a light or sedentary nature, e.g., light house work, office work [Normal] : affect appropriate [de-identified] : lower mid lip [FreeTextEntry1] : +erythema with healing skin (improved compared to the last visit) [de-identified] : Right inguinal lymphadenopathy ~3x2 cm, mobile.

## 2021-04-20 DIAGNOSIS — I48.0 PAROXYSMAL ATRIAL FIBRILLATION: ICD-10-CM

## 2021-04-22 ENCOUNTER — APPOINTMENT (OUTPATIENT)
Dept: FAMILY MEDICINE | Facility: CLINIC | Age: 71
End: 2021-04-22
Payer: MEDICARE

## 2021-04-22 VITALS
HEIGHT: 69 IN | HEART RATE: 86 BPM | WEIGHT: 142 LBS | RESPIRATION RATE: 12 BRPM | SYSTOLIC BLOOD PRESSURE: 134 MMHG | DIASTOLIC BLOOD PRESSURE: 70 MMHG | OXYGEN SATURATION: 98 % | BODY MASS INDEX: 21.03 KG/M2 | TEMPERATURE: 97.9 F

## 2021-04-22 DIAGNOSIS — H81.10 BENIGN PAROXYSMAL VERTIGO, UNSPECIFIED EAR: ICD-10-CM

## 2021-04-22 DIAGNOSIS — L58.9 RADIODERMATITIS, UNSPECIFIED: ICD-10-CM

## 2021-04-22 DIAGNOSIS — Z92.3 PERSONAL HISTORY OF IRRADIATION: ICD-10-CM

## 2021-04-22 DIAGNOSIS — Z85.89 PERSONAL HISTORY OF MALIGNANT NEOPLASM OF OTHER ORGANS AND SYSTEMS: ICD-10-CM

## 2021-04-22 DIAGNOSIS — Z76.89 PERSONS ENCOUNTERING HEALTH SERVICES IN OTHER SPECIFIED CIRCUMSTANCES: ICD-10-CM

## 2021-04-22 DIAGNOSIS — Z79.899 OTHER LONG TERM (CURRENT) DRUG THERAPY: ICD-10-CM

## 2021-04-22 PROCEDURE — 99213 OFFICE O/P EST LOW 20 MIN: CPT

## 2021-04-22 NOTE — HEALTH RISK ASSESSMENT
[Yes] : Yes [Monthly or less (1 pt)] : Monthly or less (1 point) [1 or 2 (0 pts)] : 1 or 2 (0 points) [Never (0 pts)] : Never (0 points) [No] : In the past 12 months have you used drugs other than those required for medical reasons? No [No falls in past year] : Patient reported no falls in the past year [0] : 2) Feeling down, depressed, or hopeless: Not at all (0) [] : No [de-identified] : none [de-identified] : Dr. Campbell - GYN, Dr. East - Cardiologist, Dr. Sanchez - surgeon, Dr. Rosario - [place], Dr. Franklin - ENT, dermatologist in [place] [de-identified] : rarely [Audit-CScore] : 1 [de-identified] : walking, yoga [de-identified] : pretty good [CKY4Mcdll] : 0

## 2021-04-22 NOTE — ASSESSMENT
[FreeTextEntry1] : This is a 68 y/o female with PMHx significant for Anal Cancer s/p chemo / RT completed 6/17, HTN, Carotid stenosis, HLD, Macrocytic anemia, SCC presenting to the office for follow up. and medication refills.\par \par CVS: h/o HTN, HLD, ? PAF\par -Per cardio Dr East's note 2018, There is no evidence of previous or current Atrial Fibrillation.\par -Blood pressure under control, continue Metoprolol ER 50 mg daily, Crestor 10 mg .\par -Diet and exercise discussed.\par -Outpt fasting lipids check, CMP.\par \par ONC/GI/COLOREC: h/o anal CA s/p Rad / Chemo completed 6/17\par -Curently on remission.\par -Pt followed by Dr Rosario.\par -Next Colonoscopy due 2023\par \par HCM: \par -Colonoscopy normal 2018, repeat 2023 as per Dr Sanchez\par -Referral for mammogram given\par -Referral for Bone density given\par -Flu vaccine 11/19\par -PPSV 3/2016\par -PCV 10/18\par -TDap 07/19\par -PFIZER Covid vaccine 2/24/21, 3/17/21\par \par

## 2021-07-06 ENCOUNTER — APPOINTMENT (OUTPATIENT)
Dept: CARDIOLOGY | Facility: CLINIC | Age: 71
End: 2021-07-06
Payer: MEDICARE

## 2021-07-06 ENCOUNTER — NON-APPOINTMENT (OUTPATIENT)
Age: 71
End: 2021-07-06

## 2021-07-06 VITALS
DIASTOLIC BLOOD PRESSURE: 74 MMHG | HEART RATE: 70 BPM | WEIGHT: 142 LBS | SYSTOLIC BLOOD PRESSURE: 113 MMHG | BODY MASS INDEX: 21.03 KG/M2 | HEIGHT: 69 IN | OXYGEN SATURATION: 98 %

## 2021-07-06 PROCEDURE — 99214 OFFICE O/P EST MOD 30 MIN: CPT

## 2021-07-06 PROCEDURE — ZZZZZ: CPT

## 2021-07-06 PROCEDURE — 93000 ELECTROCARDIOGRAM COMPLETE: CPT | Mod: 59

## 2021-07-06 PROCEDURE — 93246 EXT ECG>7D<15D RECORDING: CPT

## 2021-07-06 NOTE — DISCUSSION/SUMMARY
[FreeTextEntry1] : Very poorly documented history of an undefined arrhythmia 3 years ago with no recurrence.  Doubt AF.  If 2 week monitoring shows NSR, will assume she has no PAF.  Continue beta blocker for HTN.  Hypercholesterolemia. Repeat lipids pending.

## 2021-07-06 NOTE — HISTORY OF PRESENT ILLNESS
[FreeTextEntry1] : Patient seen 3 years ago for an episode of possible AF during a procedure, with no documentation on rhythm strips.  she has been free of clinical arrhythmia since and noninvasive workup shows no substrate for arrhythmia.  She has been symptoms free and very active.  Hx of rectal Ca, no recurrence.

## 2021-07-23 NOTE — BRIEF OPERATIVE NOTE - TYPE OF ANESTHESIA
Left message for the patient stating that she can take doxycycline with food. Provided contact information. Local with sedation

## 2021-08-06 PROCEDURE — 93248 EXT ECG>7D<15D REV&INTERPJ: CPT

## 2021-08-24 ENCOUNTER — APPOINTMENT (OUTPATIENT)
Dept: ELECTROPHYSIOLOGY | Facility: CLINIC | Age: 71
End: 2021-08-24

## 2021-08-25 ENCOUNTER — APPOINTMENT (OUTPATIENT)
Dept: ELECTROPHYSIOLOGY | Facility: CLINIC | Age: 71
End: 2021-08-25
Payer: MEDICARE

## 2021-08-25 VITALS — SYSTOLIC BLOOD PRESSURE: 130 MMHG | DIASTOLIC BLOOD PRESSURE: 80 MMHG

## 2021-08-25 VITALS
WEIGHT: 142 LBS | SYSTOLIC BLOOD PRESSURE: 136 MMHG | TEMPERATURE: 98.2 F | BODY MASS INDEX: 21.52 KG/M2 | OXYGEN SATURATION: 97 % | DIASTOLIC BLOOD PRESSURE: 74 MMHG | HEART RATE: 76 BPM | HEIGHT: 68 IN

## 2021-08-25 PROCEDURE — 93000 ELECTROCARDIOGRAM COMPLETE: CPT

## 2021-08-25 PROCEDURE — 99204 OFFICE O/P NEW MOD 45 MIN: CPT

## 2021-08-31 ENCOUNTER — APPOINTMENT (OUTPATIENT)
Dept: CARDIOLOGY | Facility: CLINIC | Age: 71
End: 2021-08-31
Payer: MEDICARE

## 2021-08-31 PROCEDURE — 93306 TTE W/DOPPLER COMPLETE: CPT

## 2021-09-08 ENCOUNTER — NON-APPOINTMENT (OUTPATIENT)
Age: 71
End: 2021-09-08

## 2021-09-08 NOTE — DISCUSSION/SUMMARY
[FreeTextEntry1] : In summary, this is a 71 year old female with a history of atrial fibrillation which was first diagnosed in 2017 during a colonoscopy. Subsequently, no further AF had been documented. However, recently she underwent outpatient rhythm monitoring which revealed evidence of paroxysmal atrial fibrillation with the longest episode 3 1/2 hours long. She was completely asymptomatic whiile in AF. We discussed the various therapeutic options for treatment of atrial fibrillation including rate vs rhythm control, as well as strategies for long-term stroke prophylaxis (CHADSVASC score is 3). Since she has completely asymptomatic AF rate control may be preferable over rhythm control (ablation vs ant-arrhythmics) at this point in time. I will increase the dose of metoprolol to 50 mg bid since average ventricular rates while in AF were somewhat elevated (120 bpm). If the AF burden increases over time (currently 2%) catheter ablation can be considered in the future. In terms of stroke prophylaxis given she has a history of rectal cancer which increases the risk of bleeding we discussed oral anticoagulation vs Watchman device (MELISSA occlusion device). She prefers oral anticoagulation at this time. I will start her on eliquis 5 mg bid. In addition, I will also order a TTE for assessment of LV function and other structural disease.

## 2021-09-08 NOTE — HISTORY OF PRESENT ILLNESS
[FreeTextEntry1] : Ms. Amin is a pleasant 71 year old female referrred for arrhythmia management. She has a history of rectal cancer (in remission), HTN, HLD and atrial fibrillation. \par \par She was first diagnosed with atrial fibrillation incidentally during a colonoscopy. She reports to having no symptoms at the time of the diagnosis. Subsequently, no futher atrial fibrillation had been noted including 2 weeks of outpatient monitoring. She was recenlty seen by Dr. East and an outpatient monitoring was performed with zio patch which revealed evidence of paroxysmal atrial fibrillation with the longest episode last for 3 1/2 hours. She was again asymptomatic during these events and denies any palpitations, shortness of breath, dizziness or chest pain. TTE from 3/2017 reveals normal LV function and mild to moderate TR, LA size was normal. \par \par

## 2021-09-24 ENCOUNTER — TRANSCRIPTION ENCOUNTER (OUTPATIENT)
Age: 71
End: 2021-09-24

## 2021-10-14 ENCOUNTER — RX RENEWAL (OUTPATIENT)
Age: 71
End: 2021-10-14

## 2021-11-02 ENCOUNTER — APPOINTMENT (OUTPATIENT)
Dept: FAMILY MEDICINE | Facility: CLINIC | Age: 71
End: 2021-11-02
Payer: MEDICARE

## 2021-11-02 VITALS
DIASTOLIC BLOOD PRESSURE: 80 MMHG | OXYGEN SATURATION: 98 % | SYSTOLIC BLOOD PRESSURE: 132 MMHG | BODY MASS INDEX: 21.67 KG/M2 | HEIGHT: 68 IN | WEIGHT: 143 LBS | RESPIRATION RATE: 16 BRPM | HEART RATE: 85 BPM | TEMPERATURE: 98 F

## 2021-11-02 DIAGNOSIS — Z23 ENCOUNTER FOR IMMUNIZATION: ICD-10-CM

## 2021-11-02 PROCEDURE — 99213 OFFICE O/P EST LOW 20 MIN: CPT | Mod: 25

## 2021-11-02 PROCEDURE — 90662 IIV NO PRSV INCREASED AG IM: CPT

## 2021-11-02 PROCEDURE — G0008: CPT

## 2021-11-02 PROCEDURE — 0003A: CPT

## 2021-11-02 NOTE — PLAN
[FreeTextEntry1] : Pt requesting high flue and 3rd dose covid pfizer vaccine today, pt doing well, high flue administered on right deltoid, and covid on left deltoid, well tolerated, no s/s acute rxs, pt being monitored 15 post vacine administration. educational pamphlet provided. Pt denies any rxs with prior covid vaccines. DENISE Jaramillo RN

## 2021-11-02 NOTE — ASSESSMENT
[FreeTextEntry1] : This is a 70 y/o female with PMHx significant for Anal Cancer s/p chemo / RT completed 6/17, HTN, Carotid stenosis, HLD, Macrocytic anemia, SCC , Atrial Fibrillation on Eliquis, presenting to the office for blood work and medication refills.\par \par CVS: h/o HTN, HLD\par -Continue Metoprolol ER 50 mg daily, Crestor 10 mg .\par -Now on Eliquis 5 mg bid for A-fib\par -Diet and exercise discussed.\par \par HEME: h/o Microcytic anemia\par -Stable\par \par ONC/GI/COLOREC: h/o anal CA s/p Rad / Chemo completed 6/17, SCC\par -Dermatology referral ( sees [facility] dermatology)\par -Curently on remission.\par -Pt followed by Dr Rosario.\par \par HCM: \par -Referral for mammogram given again\par -Referral for Bone density given again\par -Flu vaccine in house today\par -PPSV 3/2016\par -PCV 10/18\par -TDap 07/19\par -PFIZER Covid vaccine completed 2/24/21, 3/17/21, booster today.\par \par

## 2021-11-02 NOTE — HISTORY OF PRESENT ILLNESS
[FreeTextEntry8] : This is a 68 y/o female with PMHx significant for Anal Cancer s/p chemo / RT completed 6/17, HTN, Carotid stenosis, HLD, Macrocytic anemia, SCC presenting to the office to establish as a new patient in the practice and requesting to have medications refills. Pt offers no acute complains at this time. [FreeTextEntry1] : medication refill, blood work, PFIZER booster vaccine. [de-identified] : This is a 70 y/o female with PMHx significant for Anal Cancer s/p chemo / RT completed 6/17, HTN, Carotid stenosis, HLD, Macrocytic anemia, SCC presenting to the office to have medications refills, blood work and Booster Covid vacine. Pt offers no acute complains at this time.

## 2021-11-02 NOTE — PHYSICAL EXAM
[Normal Rate] : normal rate  [Regular Rhythm] : with a regular rhythm [Normal S1, S2] : normal S1 and S2 [No Murmur] : no murmur heard [Normal] : soft, non-tender, non-distended, no masses palpated, no HSM and normal bowel sounds

## 2021-12-05 LAB
ALBUMIN SERPL ELPH-MCNC: 4.5 G/DL
ALP BLD-CCNC: 69 U/L
ALT SERPL-CCNC: 18 U/L
ANION GAP SERPL CALC-SCNC: 12 MMOL/L
AST SERPL-CCNC: 23 U/L
BILIRUB SERPL-MCNC: 0.4 MG/DL
BUN SERPL-MCNC: 15 MG/DL
CALCIUM SERPL-MCNC: 9.5 MG/DL
CHLORIDE SERPL-SCNC: 103 MMOL/L
CHOLEST SERPL-MCNC: 185 MG/DL
CO2 SERPL-SCNC: 26 MMOL/L
CREAT SERPL-MCNC: 0.99 MG/DL
GLUCOSE SERPL-MCNC: 94 MG/DL
HDLC SERPL-MCNC: 74 MG/DL
LDLC SERPL CALC-MCNC: 87 MG/DL
NONHDLC SERPL-MCNC: 111 MG/DL
POTASSIUM SERPL-SCNC: 4.6 MMOL/L
PROT SERPL-MCNC: 6.8 G/DL
SODIUM SERPL-SCNC: 142 MMOL/L
TRIGL SERPL-MCNC: 119 MG/DL

## 2022-02-15 ENCOUNTER — APPOINTMENT (OUTPATIENT)
Dept: COLORECTAL SURGERY | Facility: CLINIC | Age: 72
End: 2022-02-15
Payer: MEDICARE

## 2022-02-15 VITALS
BODY MASS INDEX: 21.52 KG/M2 | SYSTOLIC BLOOD PRESSURE: 159 MMHG | DIASTOLIC BLOOD PRESSURE: 76 MMHG | OXYGEN SATURATION: 96 % | TEMPERATURE: 96.3 F | WEIGHT: 142 LBS | RESPIRATION RATE: 14 BRPM | HEART RATE: 84 BPM | HEIGHT: 68 IN

## 2022-02-15 PROCEDURE — 46600 DIAGNOSTIC ANOSCOPY SPX: CPT

## 2022-02-15 PROCEDURE — 99214 OFFICE O/P EST MOD 30 MIN: CPT | Mod: 25

## 2022-02-15 NOTE — PHYSICAL EXAM
[Normal rectal exam] : exam was normal [Nonprolapsing] : a nonprolapsing (grade I) [Skin Tags] : residual hemorrhoidal skin tags were noted [Normal] : was normal [None] : there was no rectal abscess [Stool Sample Taken] : no stool obtained on rectal exam [Occult Blood Positive] : was negative for occult blood [Gross Blood] : no gross blood [Fecal Impaction] : no fecal impaction was present [Calm] : calm [de-identified] : Chronic changes of perianal skin secondary to radiation [de-identified] : no obvious lesions, induration  [de-identified] : NAD [de-identified] : NCAT [de-identified] : OSEGUERA x 4 [de-identified] : A&O x 3

## 2022-02-15 NOTE — HISTORY OF PRESENT ILLNESS
[FreeTextEntry1] : Ms. Amin presents to the office for followup after completing CRT on 6/10/17 for a T1N2M0 anal SCC that was originally diagnosed in 3//2017. She continues to report 2 well formed BMs on a daily basis without associated pain, bleeding or incontinent episodes. Last colonoscopy 3/24/18 without e/o recurrence.\par \par 3/3/20 No complaints. Passing stools ~ 3 times daily. No bleeding or pain.\par \par 2/15/22 Ms. Amin and her  return to the office for followup. She denies any issues with constipation, anorectal pain or bleeding. Last CT in 3/2021 with [facility].\par

## 2022-02-15 NOTE — PROCEDURE
[FreeTextEntry1] : Anoscopy performed after pt consent was obtained. Circumferential visualization of the anal canal  above the level of the dentate line was completed. No lesions identified.\par

## 2022-02-15 NOTE — ASSESSMENT
[FreeTextEntry1] : Ms. Amin presents to the office for followup of her h/o anal SCC. She finished chemoradiation treatment 6/2017 . She denies any anal pain, rectal bleeding, or fecal incontinent episodes. She passes three well-formed but soft movements on a daily basis every morning. She has been active and feels much improved.\par ERIKA and anoscopy in office today were fairly unremarkable. I have reassured her of this and will see her back in 1 yr for repeat anoscopy.\par Ongoing surveillance per Dr. Rosario.\par \par 2/15/22 Ms. Amin returns to the office for followup. No anorectal complaints. She is almost 5 years out from completion of CRT for anal ca. ERIKA and anoscopy in office otherwise unremarkable. Reassured. Advised followup with Dr. Rosario for surveillance imaging. Needs repeat colonoscopy in 2023. Followup in office for ERIKA and anoscopy in 1 year.

## 2022-03-02 NOTE — ED ADULT NURSE NOTE - NS ED NURSE LEVEL OF CONSCIOUSNESS ORIENTATION
What Type Of Note Output Would You Prefer (Optional)?: Standard Output How Severe Are Your Spot(S)?: moderate Have Your Spot(S) Been Treated In The Past?: has not been treated Hpi Title: Evaluation of Skin Lesions Oriented - self; Oriented - place; Oriented - time

## 2022-03-10 ENCOUNTER — APPOINTMENT (OUTPATIENT)
Dept: FAMILY MEDICINE | Facility: CLINIC | Age: 72
End: 2022-03-10
Payer: MEDICARE

## 2022-03-10 VITALS
SYSTOLIC BLOOD PRESSURE: 140 MMHG | WEIGHT: 145 LBS | DIASTOLIC BLOOD PRESSURE: 78 MMHG | HEART RATE: 80 BPM | OXYGEN SATURATION: 97 % | HEIGHT: 68 IN | RESPIRATION RATE: 16 BRPM | TEMPERATURE: 97.2 F | BODY MASS INDEX: 21.98 KG/M2

## 2022-03-10 DIAGNOSIS — I65.29 OCCLUSION AND STENOSIS OF UNSPECIFIED CAROTID ARTERY: ICD-10-CM

## 2022-03-10 PROCEDURE — 99213 OFFICE O/P EST LOW 20 MIN: CPT | Mod: 25

## 2022-03-10 PROCEDURE — 36415 COLL VENOUS BLD VENIPUNCTURE: CPT

## 2022-03-16 NOTE — HISTORY OF PRESENT ILLNESS
[FreeTextEntry1] : medication refill, blood work [de-identified] : This is a 72 y/o female with PMHx significant for Anal Cancer s/p chemo / RT completed 6/17, HTN, Carotid stenosis, HLD, Macrocytic anemia, SCC presenting to the office to have medications refills, blood work.

## 2022-03-16 NOTE — ASSESSMENT
[FreeTextEntry1] : This is a 70 y/o female with PMHx significant for Anal Cancer s/p chemo / RT completed 6/17, HTN, Carotid stenosis, HLD, Macrocytic anemia, SCC , Atrial Fibrillation on Eliquis, presenting to the office for blood work and medication refills.\par \par CVS: h/o HTN, HLD\par -Blood pressure acceptable for age\par -Continue Metoprolol ER 50 mg daily, Crestor 10 mg, both renewed today .\par -Now on Eliquis 5 mg bid for A-fib\par -Diet and exercise discussed.\par -Will check fasting lipids in house today\par \par HEME: h/o Microcytic anemia\par -Stable per last blood work done by Oncology\par -Will check CBC, Vitamin B-12 level\par \par ONC/GI/COLOREC: h/o anal CA s/p Rad / Chemo completed 6/17, SCC\par -Dermatology sees [facility] dermatology following\par -Curently on remission.\par -Pt followed by Dr Rosario.\par \par HCM: \par -Blood work in house today\par -Referral for mammogram given 11/21, not done yet\par -Referral for Bone density given 11/21, not done yet\par -Flu vaccine 11/21\par -PPSV 3/2016\par -PCV 10/18\par -TDap 07/19\par -PFIZER Covid vaccine completed 2/24/21, 3/17/21, booster 11/21.\par \par

## 2022-03-16 NOTE — HEALTH RISK ASSESSMENT
[No] : No [With Patient/Caregiver] : , with patient/caregiver [Reviewed no changes] : Reviewed, no changes [Designated Healthcare Proxy] : Designated healthcare proxy [Name: ___] : Health Care Proxy's Name: [unfilled]  [Relationship: ___] : Relationship: [unfilled] [AdvancecareDate] : 03/22

## 2022-03-16 NOTE — PHYSICAL EXAM
[Normal Rate] : normal rate  [Regular Rhythm] : with a regular rhythm [Normal S1, S2] : normal S1 and S2 [No Murmur] : no murmur heard [Normal] : normal gait, coordination grossly intact, no focal deficits and deep tendon reflexes were 2+ and symmetric

## 2022-03-17 ENCOUNTER — APPOINTMENT (OUTPATIENT)
Dept: CT IMAGING | Facility: CLINIC | Age: 72
End: 2022-03-17
Payer: MEDICARE

## 2022-03-17 ENCOUNTER — OUTPATIENT (OUTPATIENT)
Dept: OUTPATIENT SERVICES | Facility: HOSPITAL | Age: 72
LOS: 1 days | End: 2022-03-17
Payer: MEDICARE

## 2022-03-17 DIAGNOSIS — Z98.890 OTHER SPECIFIED POSTPROCEDURAL STATES: Chronic | ICD-10-CM

## 2022-03-17 DIAGNOSIS — Z90.89 ACQUIRED ABSENCE OF OTHER ORGANS: Chronic | ICD-10-CM

## 2022-03-17 DIAGNOSIS — C21.0 MALIGNANT NEOPLASM OF ANUS, UNSPECIFIED: ICD-10-CM

## 2022-03-17 DIAGNOSIS — Z90.710 ACQUIRED ABSENCE OF BOTH CERVIX AND UTERUS: Chronic | ICD-10-CM

## 2022-03-17 DIAGNOSIS — Z98.49 CATARACT EXTRACTION STATUS, UNSPECIFIED EYE: Chronic | ICD-10-CM

## 2022-03-17 PROCEDURE — 74177 CT ABD & PELVIS W/CONTRAST: CPT | Mod: 26,MG

## 2022-03-17 PROCEDURE — 71260 CT THORAX DX C+: CPT | Mod: MG

## 2022-03-17 PROCEDURE — 74177 CT ABD & PELVIS W/CONTRAST: CPT | Mod: MG

## 2022-03-17 PROCEDURE — 71260 CT THORAX DX C+: CPT | Mod: 26,MG

## 2022-03-17 PROCEDURE — 82565 ASSAY OF CREATININE: CPT

## 2022-03-17 PROCEDURE — G1004: CPT

## 2022-03-24 ENCOUNTER — OUTPATIENT (OUTPATIENT)
Dept: OUTPATIENT SERVICES | Facility: HOSPITAL | Age: 72
LOS: 1 days | Discharge: HOME | End: 2022-03-24

## 2022-03-24 DIAGNOSIS — Z98.49 CATARACT EXTRACTION STATUS, UNSPECIFIED EYE: Chronic | ICD-10-CM

## 2022-03-24 DIAGNOSIS — Z98.890 OTHER SPECIFIED POSTPROCEDURAL STATES: Chronic | ICD-10-CM

## 2022-03-24 DIAGNOSIS — C21.0 MALIGNANT NEOPLASM OF ANUS, UNSPECIFIED: ICD-10-CM

## 2022-03-24 DIAGNOSIS — Z90.710 ACQUIRED ABSENCE OF BOTH CERVIX AND UTERUS: Chronic | ICD-10-CM

## 2022-03-24 DIAGNOSIS — Z90.89 ACQUIRED ABSENCE OF OTHER ORGANS: Chronic | ICD-10-CM

## 2022-03-25 ENCOUNTER — RESULT REVIEW (OUTPATIENT)
Age: 72
End: 2022-03-25

## 2022-03-25 ENCOUNTER — APPOINTMENT (OUTPATIENT)
Dept: HEMATOLOGY ONCOLOGY | Facility: CLINIC | Age: 72
End: 2022-03-25
Payer: MEDICARE

## 2022-03-25 VITALS
HEART RATE: 77 BPM | SYSTOLIC BLOOD PRESSURE: 129 MMHG | RESPIRATION RATE: 18 BRPM | WEIGHT: 143.38 LBS | BODY MASS INDEX: 21.8 KG/M2 | DIASTOLIC BLOOD PRESSURE: 57 MMHG | OXYGEN SATURATION: 96 % | TEMPERATURE: 96.9 F

## 2022-03-25 DIAGNOSIS — I48.0 PAROXYSMAL ATRIAL FIBRILLATION: ICD-10-CM

## 2022-03-25 DIAGNOSIS — C21.0 MALIGNANT NEOPLASM OF ANUS, UNSPECIFIED: ICD-10-CM

## 2022-03-25 LAB
BASOPHILS # BLD AUTO: 0.02 K/UL — SIGNIFICANT CHANGE UP (ref 0–0.2)
BASOPHILS NFR BLD AUTO: 0.5 % — SIGNIFICANT CHANGE UP (ref 0–2)
EOSINOPHIL # BLD AUTO: 0.18 K/UL — SIGNIFICANT CHANGE UP (ref 0–0.5)
EOSINOPHIL NFR BLD AUTO: 4.6 % — SIGNIFICANT CHANGE UP (ref 0–6)
HCT VFR BLD CALC: 37.3 % — SIGNIFICANT CHANGE UP (ref 34.5–45)
HGB BLD-MCNC: 12.2 G/DL — SIGNIFICANT CHANGE UP (ref 11.5–15.5)
IMM GRANULOCYTES NFR BLD AUTO: 0.3 % — SIGNIFICANT CHANGE UP (ref 0–1.5)
LYMPHOCYTES # BLD AUTO: 0.87 K/UL — LOW (ref 1–3.3)
LYMPHOCYTES # BLD AUTO: 22.3 % — SIGNIFICANT CHANGE UP (ref 13–44)
MCHC RBC-ENTMCNC: 32.7 GM/DL — SIGNIFICANT CHANGE UP (ref 32–36)
MCHC RBC-ENTMCNC: 32.7 PG — SIGNIFICANT CHANGE UP (ref 27–34)
MCV RBC AUTO: 100 FL — SIGNIFICANT CHANGE UP (ref 80–100)
MONOCYTES # BLD AUTO: 0.32 K/UL — SIGNIFICANT CHANGE UP (ref 0–0.9)
MONOCYTES NFR BLD AUTO: 8.2 % — SIGNIFICANT CHANGE UP (ref 2–14)
NEUTROPHILS # BLD AUTO: 2.5 K/UL — SIGNIFICANT CHANGE UP (ref 1.8–7.4)
NEUTROPHILS NFR BLD AUTO: 64.1 % — SIGNIFICANT CHANGE UP (ref 43–77)
NRBC # BLD: 0 /100 WBCS — SIGNIFICANT CHANGE UP (ref 0–0)
PLATELET # BLD AUTO: 188 K/UL — SIGNIFICANT CHANGE UP (ref 150–400)
RBC # BLD: 3.73 M/UL — LOW (ref 3.8–5.2)
RBC # FLD: 12.4 % — SIGNIFICANT CHANGE UP (ref 10.3–14.5)
WBC # BLD: 3.9 K/UL — SIGNIFICANT CHANGE UP (ref 3.8–10.5)
WBC # FLD AUTO: 3.9 K/UL — SIGNIFICANT CHANGE UP (ref 3.8–10.5)

## 2022-03-25 PROCEDURE — 99215 OFFICE O/P EST HI 40 MIN: CPT

## 2022-03-25 NOTE — HISTORY OF PRESENT ILLNESS
[de-identified] : The patient was diagnosed with SCC of the anus in January of 2017 at the age of 66.  The patient initially reported an enlarged lymph node in her right inguinal region and went to her gynecologist for evaluation.  Following an ultrasound of the right inguinal lymph node, an FNA on 1/31/2017 revealed atypical squamous cells, cystic, "suspicious for squamous cell carcinoma".  Patient reports she has not had a pap test since age 63.  Her last mammogram was about 3 years ago, and she has not seen a dermatologist.  Pt. also reports recent bright red blood per rectum along with a change in bowel habits for a short time. Denies any vaginal bleeding or discharge, abdominal pain.  [de-identified] : Social: Never a smoker. [de-identified] : Patient presents today s/p CRT with 5FU and MMC completed on 6/1/17 and is ~ 2.5 years out from treatment. She notes that she is doing very well and has a good appetite, weight stable.  She denies abdominal pain, melena, hematochezia, BPBRP, dyschezia, neuropathy, or any recent problems or surgeries.  No other new complaints today.

## 2022-03-25 NOTE — PHYSICAL EXAM
[Restricted in physically strenuous activity but ambulatory and able to carry out work of a light or sedentary nature] : Status 1- Restricted in physically strenuous activity but ambulatory and able to carry out work of a light or sedentary nature, e.g., light house work, office work [Normal] : affect appropriate [de-identified] : lower mid lip [FreeTextEntry1] : +erythema with healing skin (improved compared to the last visit) [de-identified] : Right inguinal lymphadenopathy ~3x2 cm, mobile.

## 2022-03-25 NOTE — RESULTS/DATA
[FreeTextEntry1] : 3/17/22 CT C/A/P: Stable examination.\par \par 3/13/21 CT CAP:  Stable exam compared with January 30, 2020.  No evidence of metastatic disease\par \par 1/30/2020 CT C/A/P: Stable small lung nodules. No adenopathy in the thorax, abdomen or pelvis.\par \par 8/2/18 CT A/P:  No evidence of metastatic disease in the abdomen or pelvis. \par \par 4/3/18 CT Chest:  stable few small ( less than 4 mm) solid nodules in both lungs.  No new nodules.  \par \par 1/11/18 CT CAP: Biapical scarring, unchanged. A 4 mm nodule in the left upper lobe is unchanged. A 2 mm nodule in the right upper lobe is new from 2/15/2017. A 2 mm nodule in the right lower lobe opacity, unchanged. 5 mm nodule left lower lobe is unchanged. Nodular thickening of the posterior perianal region is grossly unchanged. Previously noted prominent perirectal lymph nodes in the mesorectal fat are resolved.There is mild perirectal fat stranding, likely secondary to post radiation changes. Colonic diverticulosis. Previously seen enlarged lymph node in the right inguinal region is no longer visualized. \par \par 3/24/17 Colonoscopy:  T3 anal mass with 4 LN that  were 3-10 mm.  \par \par 3/13/17 MRI of abdomen/Pelvis:  LIVER: The 1 cm indeterminant lesion on the prior CT scan corresponds to a simple cyst with thin septation in segment IVb. There is also a 3 mm cyst in segment 7.  Pelvic MRI:  TUMOR LOCATION ([place]): Anorectal junction, posterior wall. DISTANCE OF MOST INFERIOR PORTION OF LESION TO THE ANAL VERGE: 2-3cm.  CRANIOCAUDAL EXTENT FROM MOST INFERIOR PORTION OF THE TUMOR: 2.4 cm.  TUMOR CHARACTERISTICS: Semiannular enhancing lesion.  TUMOR SIZE (AP x Transverse x Craniocaudal): 2.8 x 2 cm.  Involvement of the superior internal sphincter and puborectalis sling posteriorly.\par T CATEGORY: T2.  REGIONAL LYMPH NODES (suspicious = irregular border, mixed signal intensity and/or >5mm): Multiple perirectal (approximately 4) the largest 9 mm left superior presacral. Bilateral internal iliac acute 7 mm. Necrotic right inguinal 1.5 x 2.6 cm. All nodes are stable.\par \par 3/6/17 Anal biopsy:  BIOPSIES (ANAL MASS): SQUAMOUS CELL CARCINOMA, IN REGIONS, POORLY DIFFERENTIATED, EXTENSIVELY INVASIVE.\par - FOCI OF BASALOID DIFFERENTIATION PRESENT.\par - NO DEFINITIVE LYMPHOVASCULAR INVASION.\par - TUMOR UNDERMINES RECTAL MUCOSA.\par BIOPSY (ANAL MASS, IVONNE-CUT):\par - A FEW FOCI CONSISTENT WITH SQUAMOUS CELL CARCINOMA\par BIOPSY (PERIANAL SKIN):\par - NEGATIVE FOR MALIGNANCY\par - DIATATED FOLLICLE WITH ASSOCIATED DERMAL FIBROSIS.\par \par PET/CT 3/2/17:  Right inguinal lymph node 2.5 x 2.0 cm (SUV 5.4;Right internal iliac lymph node, better delineated on CT (SUV4.9). Multiple FDG avid lymph nodes, better seen on CT. For example left perirectal lymph node, SUV 3.1 lymph node along the left rectal wall, SUV 11.8 presacral/perisigmoid lymph node, 1.0 x 0.9 cm (SUV 5.4)\par BOWEL/PERITONEUM/MESENTERY: FDG avid rectal lesion, poorly delineated on CT due to lack of oral contrast (SUV 11.8). Infiltration of the\par perirectal fat.\par \par 2/23/17 Anal biopsy:  Pathology negative for malignancy.  Mild,non-specific inflammation.  \par \par 2/15/17 CT Neck/Chest/Abd/Pelvis:  Mild asymmetric nonspecific enlargement of the left palatine tonsil, and mild nonspecific enlargement of lingual tonsils at the tongue base.  There is a 4 mm nodule in the right lobe of the thyroid gland.  LIVER: There is a 1.0 cm indeterminant lesion in the medial segment.  BOWEL: There is a nodular appearance to the posterior perianal region and focal thickening involving the anorectal region. There are prominent perirectal lymph nodes in the mesorectal fat measuring up to 9 mm., anorectal lesion should be considered.  RETROPERITONEUM: there is a nonenlarged right internal iliac chain lymph node measuring 0.9 cm with an abnormal morphology.  ABDOMINAL WALL: There is an abnormal appearing right inguinal lymph node which contains both solid and cystic components measuring 2.9 x 2.1 cm \par \par 2/13/17 B/L Mammography ([facility]) : Normal \par \par 1/31/17 LYMPH NODE, GROIN, RIGHT, FNA:  SUSPICIOUS FOR MALIGNANCY.\par \par 1/31/17 Sonography of the right groin was performed again demonstrating the 3.1 x 1.7 x 2.4 cm cystic and solid mass which was selected for biopsy.\par

## 2022-03-26 LAB
ALBUMIN SERPL ELPH-MCNC: 4.3 G/DL — SIGNIFICANT CHANGE UP (ref 3.3–5)
ALP SERPL-CCNC: 72 U/L — SIGNIFICANT CHANGE UP (ref 40–120)
ALT FLD-CCNC: 18 U/L — SIGNIFICANT CHANGE UP (ref 10–45)
ANION GAP SERPL CALC-SCNC: 11 MMOL/L — SIGNIFICANT CHANGE UP (ref 5–17)
AST SERPL-CCNC: 21 U/L — SIGNIFICANT CHANGE UP (ref 10–40)
BILIRUB SERPL-MCNC: 0.2 MG/DL — SIGNIFICANT CHANGE UP (ref 0.2–1.2)
BUN SERPL-MCNC: 18 MG/DL — SIGNIFICANT CHANGE UP (ref 7–23)
CALCIUM SERPL-MCNC: 9.4 MG/DL — SIGNIFICANT CHANGE UP (ref 8.4–10.5)
CEA SERPL-MCNC: 2.4 NG/ML — SIGNIFICANT CHANGE UP (ref 0–3.8)
CHLORIDE SERPL-SCNC: 102 MMOL/L — SIGNIFICANT CHANGE UP (ref 96–108)
CO2 SERPL-SCNC: 26 MMOL/L — SIGNIFICANT CHANGE UP (ref 22–31)
CREAT SERPL-MCNC: 0.92 MG/DL — SIGNIFICANT CHANGE UP (ref 0.5–1.3)
EGFR: 66 ML/MIN/1.73M2 — SIGNIFICANT CHANGE UP
GLUCOSE SERPL-MCNC: 102 MG/DL — HIGH (ref 70–99)
MAGNESIUM SERPL-MCNC: 2.3 MG/DL — SIGNIFICANT CHANGE UP (ref 1.6–2.6)
POTASSIUM SERPL-MCNC: 4.8 MMOL/L — SIGNIFICANT CHANGE UP (ref 3.5–5.3)
POTASSIUM SERPL-SCNC: 4.8 MMOL/L — SIGNIFICANT CHANGE UP (ref 3.5–5.3)
PROT SERPL-MCNC: 6.6 G/DL — SIGNIFICANT CHANGE UP (ref 6–8.3)
SODIUM SERPL-SCNC: 139 MMOL/L — SIGNIFICANT CHANGE UP (ref 135–145)

## 2022-03-29 LAB
ALBUMIN SERPL ELPH-MCNC: 4.5 G/DL
ALP BLD-CCNC: 75 U/L
ALT SERPL-CCNC: 20 U/L
ANION GAP SERPL CALC-SCNC: 15 MMOL/L
AST SERPL-CCNC: 18 U/L
BASOPHILS # BLD AUTO: 0.05 K/UL
BASOPHILS NFR BLD AUTO: 1 %
BILIRUB SERPL-MCNC: 0.3 MG/DL
BUN SERPL-MCNC: 18 MG/DL
CALCIUM SERPL-MCNC: 9.5 MG/DL
CHLORIDE SERPL-SCNC: 103 MMOL/L
CHOLEST SERPL-MCNC: 186 MG/DL
CO2 SERPL-SCNC: 23 MMOL/L
CREAT SERPL-MCNC: 0.91 MG/DL
EGFR: 67 ML/MIN/1.73M2
EOSINOPHIL # BLD AUTO: 0.2 K/UL
EOSINOPHIL NFR BLD AUTO: 3.9 %
GLUCOSE SERPL-MCNC: 94 MG/DL
HCT VFR BLD CALC: 41.4 %
HDLC SERPL-MCNC: 77 MG/DL
HGB BLD-MCNC: 13.1 G/DL
IMM GRANULOCYTES NFR BLD AUTO: 0.4 %
LDLC SERPL CALC-MCNC: 86 MG/DL
LYMPHOCYTES # BLD AUTO: 0.94 K/UL
LYMPHOCYTES NFR BLD AUTO: 18.3 %
MAN DIFF?: NORMAL
MCHC RBC-ENTMCNC: 31.6 GM/DL
MCHC RBC-ENTMCNC: 32.8 PG
MCV RBC AUTO: 103.8 FL
MONOCYTES # BLD AUTO: 0.34 K/UL
MONOCYTES NFR BLD AUTO: 6.6 %
NEUTROPHILS # BLD AUTO: 3.58 K/UL
NEUTROPHILS NFR BLD AUTO: 69.8 %
NONHDLC SERPL-MCNC: 108 MG/DL
PLATELET # BLD AUTO: 230 K/UL
POTASSIUM SERPL-SCNC: 4.9 MMOL/L
PROT SERPL-MCNC: 6.9 G/DL
RBC # BLD: 3.99 M/UL
RBC # FLD: 12.7 %
SODIUM SERPL-SCNC: 141 MMOL/L
TRIGL SERPL-MCNC: 114 MG/DL
VIT B12 SERPL-MCNC: 490 PG/ML
WBC # FLD AUTO: 5.13 K/UL

## 2022-05-18 ENCOUNTER — APPOINTMENT (OUTPATIENT)
Dept: FAMILY MEDICINE | Facility: CLINIC | Age: 72
End: 2022-05-18
Payer: MEDICARE

## 2022-05-18 VITALS
SYSTOLIC BLOOD PRESSURE: 126 MMHG | TEMPERATURE: 97.1 F | HEART RATE: 80 BPM | RESPIRATION RATE: 14 BRPM | OXYGEN SATURATION: 97 % | DIASTOLIC BLOOD PRESSURE: 78 MMHG | HEIGHT: 68 IN | WEIGHT: 142 LBS | BODY MASS INDEX: 21.52 KG/M2

## 2022-05-18 PROCEDURE — 0094A: CPT

## 2022-05-19 NOTE — HISTORY OF PRESENT ILLNESS
[FreeTextEntry1] : PT HERE TODAY FOR 4RD DOSE MODERNA VACCINE. PT PREVIOUSLY GOT PFIZER BUT REQ. MODERNA TODAY.  [de-identified] : PT ASYMPTOMATIC.

## 2022-05-19 NOTE — ADDENDUM
[FreeTextEntry1] : 4RD DOSE MODERNA VACCIINE ADMINISTERED TODAY ON LEFT DELTOID, WELL TOLERATED, WRITTEN INFO PROVIDED AND INST. POST VACCINE DISCUSSED. PT HANG.  UNDERSTANDING. JOSE M GARCIA

## 2022-06-27 ENCOUNTER — APPOINTMENT (OUTPATIENT)
Dept: FAMILY MEDICINE | Facility: CLINIC | Age: 72
End: 2022-06-27

## 2022-09-01 ENCOUNTER — OUTPATIENT (OUTPATIENT)
Dept: OUTPATIENT SERVICES | Facility: HOSPITAL | Age: 72
LOS: 1 days | Discharge: HOME | End: 2022-09-01

## 2022-09-01 DIAGNOSIS — C21.0 MALIGNANT NEOPLASM OF ANUS, UNSPECIFIED: ICD-10-CM

## 2022-09-01 DIAGNOSIS — Z98.890 OTHER SPECIFIED POSTPROCEDURAL STATES: Chronic | ICD-10-CM

## 2022-09-01 DIAGNOSIS — Z90.89 ACQUIRED ABSENCE OF OTHER ORGANS: Chronic | ICD-10-CM

## 2022-09-01 DIAGNOSIS — Z98.49 CATARACT EXTRACTION STATUS, UNSPECIFIED EYE: Chronic | ICD-10-CM

## 2022-09-01 DIAGNOSIS — Z90.710 ACQUIRED ABSENCE OF BOTH CERVIX AND UTERUS: Chronic | ICD-10-CM

## 2022-09-08 ENCOUNTER — APPOINTMENT (OUTPATIENT)
Dept: HEMATOLOGY ONCOLOGY | Facility: CLINIC | Age: 72
End: 2022-09-08

## 2022-09-16 ENCOUNTER — APPOINTMENT (OUTPATIENT)
Dept: FAMILY MEDICINE | Facility: CLINIC | Age: 72
End: 2022-09-16

## 2022-09-16 ENCOUNTER — LABORATORY RESULT (OUTPATIENT)
Age: 72
End: 2022-09-16

## 2022-09-16 VITALS
HEIGHT: 68 IN | WEIGHT: 145 LBS | DIASTOLIC BLOOD PRESSURE: 70 MMHG | SYSTOLIC BLOOD PRESSURE: 140 MMHG | HEART RATE: 85 BPM | TEMPERATURE: 97.6 F | BODY MASS INDEX: 21.98 KG/M2 | OXYGEN SATURATION: 98 % | RESPIRATION RATE: 14 BRPM

## 2022-09-16 DIAGNOSIS — Z00.00 ENCOUNTER FOR GENERAL ADULT MEDICAL EXAMINATION W/OUT ABNORMAL FINDINGS: ICD-10-CM

## 2022-09-16 PROCEDURE — 0124A: CPT

## 2022-09-16 PROCEDURE — G0439: CPT

## 2022-09-16 PROCEDURE — G0444 DEPRESSION SCREEN ANNUAL: CPT

## 2022-09-16 PROCEDURE — 36415 COLL VENOUS BLD VENIPUNCTURE: CPT

## 2022-09-16 NOTE — HISTORY OF PRESENT ILLNESS
[Spouse] : spouse [FreeTextEntry1] : Annual physical [de-identified] : This is a 73 y/o female with PMHx significant for Anal Cancer Dx in 01/2017, s/p chemo / RT completed 6/17, HTN, Carotid stenosis, HLD, Macrocytic anemia, SCC A.Fib recenlty Dx, now on Eliquis and metoprolol presenting for CPE.\par feels good overall. No present complaints.\par

## 2022-09-16 NOTE — PAST MEDICAL HISTORY
[Surgical Menopause] : in surgical menopause [Total Preg ___] : G[unfilled] [Abortions ___] : Abortions:[unfilled] [de-identified] : 1997

## 2022-09-16 NOTE — ASSESSMENT
[FreeTextEntry1] : This is a 71 y/o female with PMHx significant for Anal Cancer s/p chemo / RT completed 6/17, HTN, Carotid stenosis, HLD, Macrocytic anemia, SCC , Atrial Fibrillation on Eliquis, presenting to the office for blood work and medication refills.\par \par [facility]: h/o HTN, HLD\par -Blood pressure acceptable for age\par -Continue Metoprolol ER 50 mg daily.\par -Now on Eliquis 5 mg bid for A-fib\par -Diet and exercise discussed.\par -Will check fasting lipids in house today\par \par HEME: h/o Microcytic anemia\par -Stable per last blood work done by Oncology\par -Will check CBC, Vitamin B-12 level\par \par ONC/GI/COLOREC: h/o anal CA s/p Rad / Chemo completed 6/17, SCC\par -Dermatology sees [facility] dermatology following\par -Curently on remission.\par -Pt followed by Dr Rosario.\par \par HCM: \par -Blood work in house today\par -Referral for mammogram .\par -Referral for Bone density.\par -Flu vaccine 11/21\par -PPSV 3/2016\par -PCV 10/18\par -TDap 07/19\par -PFIZER Covid vaccine completed 2/24/21, 3/17/21, booster 11/21. COVID Bivalent today\par \par  \par \par

## 2022-09-16 NOTE — HEALTH RISK ASSESSMENT
[Good] : ~his/her~  mood as  good [Never] : Never [No] : In the past 12 months have you used drugs other than those required for medical reasons? No [No falls in past year] : Patient reported no falls in the past year [0] : 2) Feeling down, depressed, or hopeless: Not at all (0) [PHQ-2 Negative - No further assessment needed] : PHQ-2 Negative - No further assessment needed [Patient reported mammogram was normal] : Patient reported mammogram was normal [Patient declined PAP Smear] : Patient declined PAP Smear [Patient reported bone density results were normal] : Patient reported bone density results were normal [Patient reported colonoscopy was normal] : Patient reported colonoscopy was normal [HIV test declined] : HIV test declined [Hepatitis C test declined] : Hepatitis C test declined [None] : None [With Significant Other] : lives with significant other [Retired] : retired [] :  [# Of Children ___] : has [unfilled] children [Sexually Active] : sexually active [Feels Safe at Home] : Feels safe at home [Fully functional (bathing, dressing, toileting, transferring, walking, feeding)] : Fully functional (bathing, dressing, toileting, transferring, walking, feeding) [Fully functional (using the telephone, shopping, preparing meals, housekeeping, doing laundry, using] : Fully functional and needs no help or supervision to perform IADLs (using the telephone, shopping, preparing meals, housekeeping, doing laundry, using transportation, managing medications and managing finances) [Smoke Detector] : smoke detector [Safety elements used in home] : safety elements used in home [Seat Belt] :  uses seat belt [With Patient/Caregiver] : , with patient/caregiver [Designated Healthcare Proxy] : Designated healthcare proxy [Name: ___] : Health Care Proxy's Name: [unfilled]  [Relationship: ___] : Relationship: [unfilled] [de-identified] : Oncology, Portology, Cardiology [de-identified] : walks [de-identified] : variety, oatmeal, yogurt... [[id number]] : 0 [Reports changes in hearing] : Reports no changes in hearing [Reports changes in vision] : Reports no changes in vision [Reports changes in dental health] : Reports no changes in dental health [TB Exposure] : is not being exposed to tuberculosis [MammogramDate] : 2018 [BoneDensityDate] : 2018 [ColonoscopyDate] : 2018 [AdvancecareDate] : 09/22

## 2022-09-18 LAB
25(OH)D3 SERPL-MCNC: 35.4 NG/ML
ALBUMIN SERPL ELPH-MCNC: 4.6 G/DL
ALP BLD-CCNC: 72 U/L
ALT SERPL-CCNC: 20 U/L
ANION GAP SERPL CALC-SCNC: 10 MMOL/L
APPEARANCE: CLEAR
AST SERPL-CCNC: 24 U/L
BASOPHILS # BLD AUTO: 0.13 K/UL
BASOPHILS NFR BLD AUTO: 2.6 %
BILIRUB SERPL-MCNC: 0.3 MG/DL
BILIRUBIN URINE: NEGATIVE
BLOOD URINE: ABNORMAL
BUN SERPL-MCNC: 16 MG/DL
CALCIUM SERPL-MCNC: 9.4 MG/DL
CHLORIDE SERPL-SCNC: 104 MMOL/L
CHOLEST SERPL-MCNC: 178 MG/DL
CO2 SERPL-SCNC: 27 MMOL/L
COLOR: COLORLESS
CREAT SERPL-MCNC: 0.84 MG/DL
EGFR: 73 ML/MIN/1.73M2
EOSINOPHIL # BLD AUTO: 0.21 K/UL
EOSINOPHIL NFR BLD AUTO: 4.3 %
GLUCOSE QUALITATIVE U: NEGATIVE
GLUCOSE SERPL-MCNC: 102 MG/DL
HCT VFR BLD CALC: 42.4 %
HDLC SERPL-MCNC: 73 MG/DL
HGB BLD-MCNC: 12.7 G/DL
KETONES URINE: NEGATIVE
LDLC SERPL CALC-MCNC: 80 MG/DL
LEUKOCYTE ESTERASE URINE: NEGATIVE
LYMPHOCYTES # BLD AUTO: 0.98 K/UL
LYMPHOCYTES NFR BLD AUTO: 20 %
MAN DIFF?: NORMAL
MCHC RBC-ENTMCNC: 30 GM/DL
MCHC RBC-ENTMCNC: 32.2 PG
MCV RBC AUTO: 107.3 FL
MONOCYTES # BLD AUTO: 0.43 K/UL
MONOCYTES NFR BLD AUTO: 8.7 %
NEUTROPHILS # BLD AUTO: 3.16 K/UL
NEUTROPHILS NFR BLD AUTO: 64.4 %
NITRITE URINE: NEGATIVE
NONHDLC SERPL-MCNC: 105 MG/DL
PH URINE: 6.5
PLATELET # BLD AUTO: 215 K/UL
POTASSIUM SERPL-SCNC: 4.9 MMOL/L
PROT SERPL-MCNC: 6.9 G/DL
PROTEIN URINE: NEGATIVE
RBC # BLD: 3.95 M/UL
RBC # FLD: 13 %
SODIUM SERPL-SCNC: 141 MMOL/L
SPECIFIC GRAVITY URINE: 1.01
TRIGL SERPL-MCNC: 129 MG/DL
TSH SERPL-ACNC: 4.26 UIU/ML
UROBILINOGEN URINE: NORMAL
WBC # FLD AUTO: 4.9 K/UL

## 2022-10-07 ENCOUNTER — RX RENEWAL (OUTPATIENT)
Age: 72
End: 2022-10-07

## 2022-11-07 ENCOUNTER — APPOINTMENT (OUTPATIENT)
Dept: CARDIOLOGY | Facility: CLINIC | Age: 72
End: 2022-11-07

## 2022-11-07 ENCOUNTER — NON-APPOINTMENT (OUTPATIENT)
Age: 72
End: 2022-11-07

## 2022-11-07 VITALS
SYSTOLIC BLOOD PRESSURE: 134 MMHG | WEIGHT: 145 LBS | DIASTOLIC BLOOD PRESSURE: 97 MMHG | BODY MASS INDEX: 21.98 KG/M2 | RESPIRATION RATE: 15 BRPM | HEART RATE: 70 BPM | OXYGEN SATURATION: 96 % | HEIGHT: 68 IN

## 2022-11-07 PROCEDURE — 93000 ELECTROCARDIOGRAM COMPLETE: CPT

## 2022-11-07 PROCEDURE — 99214 OFFICE O/P EST MOD 30 MIN: CPT

## 2022-11-07 NOTE — HISTORY OF PRESENT ILLNESS
[FreeTextEntry1] : 72 year old woman with history of PAF.  On eliquis and metoprolol.  She has no complaints of chest pain or dyspnea but is in AF on EKG.

## 2022-11-07 NOTE — DISCUSSION/SUMMARY
[FreeTextEntry1] : Patient in AF .  Increase metoprolol to BID.  Holter to assess percentage of AF burden.  Patient will make an appointment to see Dr. Alvarado (EP).

## 2022-11-30 ENCOUNTER — APPOINTMENT (OUTPATIENT)
Dept: ELECTROPHYSIOLOGY | Facility: CLINIC | Age: 72
End: 2022-11-30
Payer: MEDICARE

## 2022-11-30 VITALS
HEIGHT: 68 IN | TEMPERATURE: 98.2 F | WEIGHT: 144 LBS | HEART RATE: 72 BPM | BODY MASS INDEX: 21.82 KG/M2 | SYSTOLIC BLOOD PRESSURE: 118 MMHG | OXYGEN SATURATION: 95 % | DIASTOLIC BLOOD PRESSURE: 72 MMHG

## 2022-11-30 PROCEDURE — 93000 ELECTROCARDIOGRAM COMPLETE: CPT

## 2022-11-30 PROCEDURE — 99214 OFFICE O/P EST MOD 30 MIN: CPT

## 2023-01-04 ENCOUNTER — OUTPATIENT (OUTPATIENT)
Dept: OUTPATIENT SERVICES | Facility: HOSPITAL | Age: 73
LOS: 1 days | End: 2023-01-04
Payer: MEDICARE

## 2023-01-04 VITALS
WEIGHT: 143.3 LBS | OXYGEN SATURATION: 100 % | HEIGHT: 68 IN | SYSTOLIC BLOOD PRESSURE: 138 MMHG | HEART RATE: 68 BPM | TEMPERATURE: 97 F | RESPIRATION RATE: 18 BRPM | DIASTOLIC BLOOD PRESSURE: 68 MMHG

## 2023-01-04 DIAGNOSIS — Z13.89 ENCOUNTER FOR SCREENING FOR OTHER DISORDER: ICD-10-CM

## 2023-01-04 DIAGNOSIS — Z29.9 ENCOUNTER FOR PROPHYLACTIC MEASURES, UNSPECIFIED: ICD-10-CM

## 2023-01-04 DIAGNOSIS — Z98.890 OTHER SPECIFIED POSTPROCEDURAL STATES: Chronic | ICD-10-CM

## 2023-01-04 DIAGNOSIS — I10 ESSENTIAL (PRIMARY) HYPERTENSION: ICD-10-CM

## 2023-01-04 DIAGNOSIS — Z98.49 CATARACT EXTRACTION STATUS, UNSPECIFIED EYE: Chronic | ICD-10-CM

## 2023-01-04 DIAGNOSIS — Z01.818 ENCOUNTER FOR OTHER PREPROCEDURAL EXAMINATION: ICD-10-CM

## 2023-01-04 DIAGNOSIS — Z90.89 ACQUIRED ABSENCE OF OTHER ORGANS: Chronic | ICD-10-CM

## 2023-01-04 DIAGNOSIS — I48.0 PAROXYSMAL ATRIAL FIBRILLATION: ICD-10-CM

## 2023-01-04 DIAGNOSIS — Z90.710 ACQUIRED ABSENCE OF BOTH CERVIX AND UTERUS: Chronic | ICD-10-CM

## 2023-01-04 LAB
ANION GAP SERPL CALC-SCNC: 8 MMOL/L — SIGNIFICANT CHANGE UP (ref 5–17)
APTT BLD: 34.7 SEC — SIGNIFICANT CHANGE UP (ref 27.5–35.5)
BASOPHILS # BLD AUTO: 0.06 K/UL — SIGNIFICANT CHANGE UP (ref 0–0.2)
BASOPHILS NFR BLD AUTO: 1.2 % — SIGNIFICANT CHANGE UP (ref 0–2)
BLD GP AB SCN SERPL QL: SIGNIFICANT CHANGE UP
BUN SERPL-MCNC: 15.8 MG/DL — SIGNIFICANT CHANGE UP (ref 8–20)
CALCIUM SERPL-MCNC: 9.5 MG/DL — SIGNIFICANT CHANGE UP (ref 8.4–10.5)
CHLORIDE SERPL-SCNC: 105 MMOL/L — SIGNIFICANT CHANGE UP (ref 96–108)
CO2 SERPL-SCNC: 28 MMOL/L — SIGNIFICANT CHANGE UP (ref 22–29)
CREAT SERPL-MCNC: 0.89 MG/DL — SIGNIFICANT CHANGE UP (ref 0.5–1.3)
EGFR: 68 ML/MIN/1.73M2 — SIGNIFICANT CHANGE UP
EOSINOPHIL # BLD AUTO: 0.19 K/UL — SIGNIFICANT CHANGE UP (ref 0–0.5)
EOSINOPHIL NFR BLD AUTO: 3.9 % — SIGNIFICANT CHANGE UP (ref 0–6)
GLUCOSE SERPL-MCNC: 100 MG/DL — HIGH (ref 70–99)
HCT VFR BLD CALC: 39.4 % — SIGNIFICANT CHANGE UP (ref 34.5–45)
HGB BLD-MCNC: 12.4 G/DL — SIGNIFICANT CHANGE UP (ref 11.5–15.5)
IMM GRANULOCYTES NFR BLD AUTO: 0.4 % — SIGNIFICANT CHANGE UP (ref 0–0.9)
INR BLD: 1.37 RATIO — HIGH (ref 0.88–1.16)
LYMPHOCYTES # BLD AUTO: 0.92 K/UL — LOW (ref 1–3.3)
LYMPHOCYTES # BLD AUTO: 19.1 % — SIGNIFICANT CHANGE UP (ref 13–44)
MAGNESIUM SERPL-MCNC: 2.4 MG/DL — SIGNIFICANT CHANGE UP (ref 1.8–2.6)
MCHC RBC-ENTMCNC: 31.5 GM/DL — LOW (ref 32–36)
MCHC RBC-ENTMCNC: 31.6 PG — SIGNIFICANT CHANGE UP (ref 27–34)
MCV RBC AUTO: 100.5 FL — HIGH (ref 80–100)
MONOCYTES # BLD AUTO: 0.37 K/UL — SIGNIFICANT CHANGE UP (ref 0–0.9)
MONOCYTES NFR BLD AUTO: 7.7 % — SIGNIFICANT CHANGE UP (ref 2–14)
NEUTROPHILS # BLD AUTO: 3.26 K/UL — SIGNIFICANT CHANGE UP (ref 1.8–7.4)
NEUTROPHILS NFR BLD AUTO: 67.7 % — SIGNIFICANT CHANGE UP (ref 43–77)
PLATELET # BLD AUTO: 204 K/UL — SIGNIFICANT CHANGE UP (ref 150–400)
POTASSIUM SERPL-MCNC: 4.7 MMOL/L — SIGNIFICANT CHANGE UP (ref 3.5–5.3)
POTASSIUM SERPL-SCNC: 4.7 MMOL/L — SIGNIFICANT CHANGE UP (ref 3.5–5.3)
PROTHROM AB SERPL-ACNC: 16 SEC — HIGH (ref 10.5–13.4)
RBC # BLD: 3.92 M/UL — SIGNIFICANT CHANGE UP (ref 3.8–5.2)
RBC # FLD: 12.4 % — SIGNIFICANT CHANGE UP (ref 10.3–14.5)
SARS-COV-2 RNA SPEC QL NAA+PROBE: SIGNIFICANT CHANGE UP
SODIUM SERPL-SCNC: 140 MMOL/L — SIGNIFICANT CHANGE UP (ref 135–145)
WBC # BLD: 4.82 K/UL — SIGNIFICANT CHANGE UP (ref 3.8–10.5)
WBC # FLD AUTO: 4.82 K/UL — SIGNIFICANT CHANGE UP (ref 3.8–10.5)

## 2023-01-04 PROCEDURE — 93010 ELECTROCARDIOGRAM REPORT: CPT

## 2023-01-04 PROCEDURE — 93005 ELECTROCARDIOGRAM TRACING: CPT

## 2023-01-04 RX ORDER — FEXOFENADINE HCL 30 MG
1 TABLET ORAL
Qty: 0 | Refills: 0 | DISCHARGE

## 2023-01-04 RX ORDER — FLUTICASONE PROPIONATE 50 MCG
1 SPRAY, SUSPENSION NASAL
Qty: 0 | Refills: 0 | DISCHARGE

## 2023-01-04 RX ORDER — METOPROLOL TARTRATE 50 MG
1 TABLET ORAL
Qty: 0 | Refills: 0 | DISCHARGE

## 2023-01-04 NOTE — H&P PST ADULT - HISTORY OF PRESENT ILLNESS
palpitations dizziness and lightheadedness  fatigue  72 year old female with history of HTN, HLD, cancer of the anus and symptomatic paroxysmal atrial fibrillation. Patient states having "more episodes of A-fib". Reports fatigue, palpitations, dizziness and lightheadedness when in A-Fib. Denies chest pain, dyspnea or peripheral edema. Denies any s/s today. Currently of Eliquis BID. Now scheduled for Afib ablation/ ANES/ CARTO on 1/9/2023 with Dr. Alvarado.      KLIO 8/31/2021: systolic function normal, LVEF 60-65%, RV normal size and function PASP 33mmHg, mildly enlarged left atrium, right atrium normal, no pericardial effusion, sclerotic aortic valve with decreased opening, mild mitral valve regurgitation, mild-moderate tricuspid regurgitation.

## 2023-01-04 NOTE — H&P PST ADULT - ASSESSMENT
This is a pleasant 72 year old female in NAD with history of HTN, HLD, cancer of the anus and symptomatic paroxysmal atrial fibrillation. Patient states having "more episodes of A-fib". Reports fatigue, palpitations, dizziness and lightheadedness when in A-Fib. Denies chest pain, dyspnea or peripheral edema. Denies any s/s today. Currently of Eliquis BID. Now scheduled for Afib ablation/ ANES/ CARTO on 2023 with Dr. Alvarado.      KILO 2021: systolic function normal, LVEF 60-65%, RV normal size and function PASP 33mmHg, mildly enlarged left atrium, right atrium normal, no pericardial effusion, sclerotic aortic valve with decreased opening, mild mitral valve regurgitation, mild-moderate tricuspid regurgitation.        Plan:   Labs pending.   Pre-procedure instructions provided (verbal & written) as follows:  Ablation 2023  - Last dose Eliquis 2023 PM (NO a/c day of ablation).    - NPO after midnight prior except meds w/ sips of water.     CAPRINI SCORE    AGE RELATED RISK FACTORS                                                             [ ] Age 41-60 years                                            (1 Point)  [X ] Age: 61-74 years                                           (2 Points)                 [ ] Age= 75 years                                                (3 Points)             DISEASE RELATED RISK FACTORS                                                       [ ] Edema in the lower extremities                 (1 Point)                     [X ] Varicose veins                                               (1 Point)                                 [ ] BMI > 25 Kg/m2                                            (1 Point)                                  [ ] Serious infection (ie PNA)                            (1 Point)                     [ ] Lung disease ( COPD, Emphysema)            (1 Point)                                                                          [ ] Acute myocardial infarction                         (1 Point)                  [ ] Congestive heart failure (in the previous month)  (1 Point)         [ ] Inflammatory bowel disease                            (1 Point)                  [ ] Central venous access, PICC or Port               (2 points)       (within the last month)                                                                [ ] Stroke (in the previous month)                        (5 Points)    [X ] Previous or present malignancy                       (2 points)                                                                                                                                                         HEMATOLOGY RELATED FACTORS                                                         [ ] Prior episodes of VTE                                     (3 Points)                     [ ] Positive family history for VTE                      (3 Points)                  [ ] Prothrombin 13883 A                                     (3 Points)                     [ ] Factor V Leiden                                                (3 Points)                        [ ] Lupus anticoagulants                                      (3 Points)                                                           [ ] Anticardiolipin antibodies                              (3 Points)                                                       [ ] High homocysteine in the blood                   (3 Points)                                             [ ] Other congenital or acquired thrombophilia      (3 Points)                                                [ ] Heparin induced thrombocytopenia                  (3 Points)                                        MOBILITY RELATED FACTORS  [ ] Bed rest                                                         (1 Point)  [ ] Plaster cast                                                    (2 points)  [ ] Bed bound for more than 72 hours           (2 Points)    GENDER SPECIFIC FACTORS  [ ] Pregnancy or had a baby within the last month   (1 Point)  [ ] Post-partum < 6 weeks                                   (1 Point)  [ ] Hormonal therapy  or oral contraception   (1 Point)  [ ] History of pregnancy complications              (1 point)  [ ] Unexplained or recurrent              (1 Point)    OTHER RISK FACTORS                                           (1 Point)  [ ] BMI >40, smoking, diabetes requiring insulin, chemotherapy  blood transfusions and length of surgery over 2 hours    SURGERY RELATED RISK FACTORS  [ ]  Section within the last month     (1 Point)  [X ] Minor surgery                                                  (1 Point)  [ ] Arthroscopic surgery                                       (2 Points)  [ ] Planned major surgery lasting more            (2 Points)      than 45 minutes     [ ] Elective hip or knee joint replacement       (5 points)       surgery                                                TRAUMA RELATED RISK FACTORS  [ ] Fracture of the hip, pelvis, or leg                       (5 Points)  [ ] Spinal cord injury resulting in paralysis             (5 points)       (in the previous month)    [ ] Paralysis  (less than 1 month)                             (5 Points)  [ ] Multiple Trauma within 1 month                        (5 Points)    Total Score [      5  ]    Caprini Score 0-2: Low Risk, NO VTE prophylaxis required for most patients, encourage ambulation  Caprini Score 3-6: Moderate Risk , pharmacologic VTE prophylaxis is indicated for most patients (in the absence of contraindications)  Caprini Score Greater than or =7: High risk, pharmocologic VTE prophylaxis indicated for most patients (in the absence of contraindications)    OPIOID RISK TOOL    JEREMIAS EACH BOX THAT APPLIES AND ADD TOTALS AT THE END    FAMILY HISTORY OF SUBSTANCE ABUSE                 FEMALE         MALE                                                Alcohol                             [  ]1 pt          [  ]3pts                                               Illegal Durgs                     [  ]2 pts        [  ]3pts                                               Rx Drugs                           [  ]4 pts        [  ]4 pts    PERSONAL HISTORY OF SUBSTANCE ABUSE                                                                                          Alcohol                             [  ]3 pts       [  ]3 pts                                               Illegal Drugs                     [  ]4 pts        [  ]4 pts                                               Rx Drugs                           [  ]5 pts        [  ]5 pts    AGE BETWEEN 16-45 YEARS                                      [  ]1 pt         [  ]1 pt    HISTORY OF PREADOLESCENT   SEXUAL ABUSE                                                             [  ]3 pts        [  ]0pts    PSYCHOLOGICAL DISEASE                     ADD, OCD, Bipolar, Schizophrenia        [  ]2 pts         [  ]2 pts                      Depression                                               [  ]1 pt           [  ]1 pt           SCORING TOTAL   (add numbers and type here)              (**0*)                                     A score of 3 or lower indicated LOW risk for future opioid abuse  A score of 4 to 7 indicated moderate risk for future opioid abuse  A score of 8 or higher indicates a high risk for opioid abuse

## 2023-01-04 NOTE — H&P PST ADULT - PROBLEM SELECTOR PLAN 2
BP today 138/68. Continue medications as instructed.  Patient instructed to take BP medications with a sip of water morning of procedure, verbalized understanding of instructions.

## 2023-01-04 NOTE — H&P PST ADULT - NSICDXFAMILYHX_GEN_ALL_CORE_FT
FAMILY HISTORY:  FH: Parkinson's disease    Father  Still living? No  Family history of liver cancer, Age at diagnosis: 71-80  Family history of pancreatic cancer, Age at diagnosis: 71-80  Family history of pancreatic cancer, Age at diagnosis: 71-80  Family history of primary liver cancer, Age at diagnosis: 71-80  FH: atrial fibrillation, Age at diagnosis: Age Unknown    Mother  Still living? No  Family history of Alzheimer's disease, Age at diagnosis: 71-80  Family history of cerebrovascular accident (CVA) in mother, Age at diagnosis: 71-80  Family history of TIAs, Age at diagnosis: 71-80    Sibling  Still living? Yes, Estimated age: 61-70  Family history of skin cancer, Age at diagnosis: 61-70

## 2023-01-04 NOTE — H&P PST ADULT - HEMATOLOGY/LYMPHATICS
Detail Level: Zone Render In Strict Bullet Format?: No Initiate Treatment: Ketoconazole shampoo apply to scalp 3-4x a week Initiate Treatment: Clobetasol Solution apply to scalp QD x2 weeks then stop x2 weeks repeat only as needed details…

## 2023-01-04 NOTE — H&P PST ADULT - NSICDXPASTMEDICALHX_GEN_ALL_CORE_FT
PAST MEDICAL HISTORY:  Allergic rhinitis due to allergen     Anal cancer     Cataracts, both eyes     Elevated cholesterol     Hyperlipidemia     Hypertension     Lymph node cancer     Paroxysmal atrial fibrillation     Seasonal allergies     Squamous cell cancer, anus

## 2023-01-04 NOTE — H&P PST ADULT - NSICDXPASTSURGICALHX_GEN_ALL_CORE_FT
PAST SURGICAL HISTORY:  History of back surgery s/p discectomy 1973    History of eye surgery bilateral cataracts    History of tonsillectomy     S/P cataract surgery B/L 2015    S/P colonoscopy Baseline 2006    S/P lumbar discectomy     S/P DANDRE (total abdominal hysterectomy) 1996    S/P tonsillectomy and adenoidectomy childhood

## 2023-01-04 NOTE — H&P PST ADULT - PROBLEM SELECTOR PLAN 1
Labs, EKG and COVID PCR performed.  Scheduled for Afib ablation/ ANES/ CARTO on 1/9/2023 with Dr. Alvarado.    Written and verbal instructions provided.  Last dose Eliquis 1/8/2023 PM (NO a/c day of ablation).   NPO after midnight prior except meds w/ sips of water.   Patient educated on  preadmission instructions,  and day of procedure medications, verbalizes understanding, teach back method utilized.  Out patient medications reviewed with patient.

## 2023-01-04 NOTE — H&P PST ADULT - NEGATIVE ENMT SYMPTOMS
no ear pain/no tinnitus/no vertigo/no nasal congestion/no post-nasal discharge/no nose bleeds/no throat pain/no dysphagia

## 2023-01-09 ENCOUNTER — OUTPATIENT (OUTPATIENT)
Dept: OUTPATIENT SERVICES | Facility: HOSPITAL | Age: 73
LOS: 1 days | End: 2023-01-09
Payer: MEDICARE

## 2023-01-09 ENCOUNTER — TRANSCRIPTION ENCOUNTER (OUTPATIENT)
Age: 73
End: 2023-01-09

## 2023-01-09 DIAGNOSIS — Z98.890 OTHER SPECIFIED POSTPROCEDURAL STATES: Chronic | ICD-10-CM

## 2023-01-09 DIAGNOSIS — Z90.89 ACQUIRED ABSENCE OF OTHER ORGANS: Chronic | ICD-10-CM

## 2023-01-09 DIAGNOSIS — Z98.49 CATARACT EXTRACTION STATUS, UNSPECIFIED EYE: Chronic | ICD-10-CM

## 2023-01-09 DIAGNOSIS — Z90.710 ACQUIRED ABSENCE OF BOTH CERVIX AND UTERUS: Chronic | ICD-10-CM

## 2023-01-09 RX ORDER — ATORVASTATIN CALCIUM 80 MG/1
1 TABLET, FILM COATED ORAL
Qty: 0 | Refills: 0 | DISCHARGE

## 2023-01-09 RX ORDER — APIXABAN 2.5 MG/1
1 TABLET, FILM COATED ORAL
Qty: 0 | Refills: 0 | DISCHARGE

## 2023-01-09 RX ORDER — METOPROLOL TARTRATE 50 MG
1 TABLET ORAL
Qty: 0 | Refills: 0 | DISCHARGE

## 2023-01-10 ENCOUNTER — TRANSCRIPTION ENCOUNTER (OUTPATIENT)
Age: 73
End: 2023-01-10

## 2023-01-10 PROCEDURE — C1893: CPT

## 2023-01-10 PROCEDURE — 86900 BLOOD TYPING SEROLOGIC ABO: CPT

## 2023-01-10 PROCEDURE — C1766: CPT

## 2023-01-10 PROCEDURE — 36415 COLL VENOUS BLD VENIPUNCTURE: CPT

## 2023-01-10 PROCEDURE — C9399: CPT

## 2023-01-10 PROCEDURE — 80048 BASIC METABOLIC PNL TOTAL CA: CPT

## 2023-01-10 PROCEDURE — 85027 COMPLETE CBC AUTOMATED: CPT

## 2023-01-10 PROCEDURE — C1732: CPT

## 2023-01-10 PROCEDURE — C1889: CPT

## 2023-01-10 PROCEDURE — 93656 COMPRE EP EVAL ABLTJ ATR FIB: CPT

## 2023-01-10 PROCEDURE — C1759: CPT

## 2023-01-10 PROCEDURE — 83735 ASSAY OF MAGNESIUM: CPT

## 2023-01-10 PROCEDURE — 86901 BLOOD TYPING SEROLOGIC RH(D): CPT

## 2023-01-10 PROCEDURE — 93005 ELECTROCARDIOGRAM TRACING: CPT

## 2023-01-10 PROCEDURE — 86850 RBC ANTIBODY SCREEN: CPT

## 2023-01-10 PROCEDURE — C1730: CPT

## 2023-01-10 PROCEDURE — 93657 TX L/R ATRIAL FIB ADDL: CPT

## 2023-01-10 PROCEDURE — C1894: CPT

## 2023-01-11 NOTE — ED PROVIDER NOTE - RESPIRATORY NEGATIVE STATEMENT, MLM
Patient Name: Joseph Garcia  : 1960    MRN: [id number]                              Today's Date: 2023       Admit Date: 2023    Visit Dx: No diagnosis found.  Patient Active Problem List   Diagnosis   • Intractable pain   • Ureteral stone   • MAGALY (acute kidney injury) (HCC)   • Left ureteral stone   • Lower urinary tract symptoms (LUTS)   • Vertigo     Past Medical History:   Diagnosis Date   • Hyperlipidemia      Past Surgical History:   Procedure Laterality Date   • CYSTOSCOPY, URETEROSCOPY, RETROGRADE PYELOGRAM, STONE EXTRACTION, STENT INSERTION Left 2021    Procedure: CYSTOSCOPY URETEROSCOPY RETROGRADE PYELOGRAM STONE EXTRACTION STENT INSERTION;  Surgeon: Rick Hargrove MD;  Location: [facility];  Service: Urology;  Laterality: Left;   • KNEE SURGERY     • SHOULDER SURGERY        General Information     Row Name 23 1517          Physical Therapy Time and Intention    Document Type therapy note (daily note)  -     Mode of Treatment physical therapy  -     Row Name 23 151          General Information    Patient Profile Reviewed yes  -     Existing Precautions/Restrictions other (see comments);fall  light/sound sensitivity, dizziness  -     Barriers to Rehab medically complex  -     Row Name 23 151          Cognition    Orientation Status (Cognition) oriented x 3  -     Row Name 23 151          Safety Issues, Functional Mobility    Safety Issues Affecting Function (Mobility) insight into deficits/self-awareness;awareness of need for assistance;safety precaution awareness  -     Impairments Affecting Function (Mobility) balance;coordination;endurance/activity tolerance;postural/trunk control;pain;strength  -           User Key  (r) = Recorded By, (t) = Taken By, (c) = Cosigned By    Initials Name Provider Type     Gisel Weeks PT Physical Therapist               Mobility     Row Name 23 1517          Bed Mobility    Supine-Sit  Brule (Bed Mobility) minimum assist (75% patient effort);1 person assist  -HM     Sit-Supine Brule (Bed Mobility) contact guard;1 person assist  -HM     Assistive Device (Bed Mobility) bed rails;head of bed elevated  -     Row Name 01/11/23 1517          Sit-Stand Transfer    Sit-Stand Brule (Transfers) minimum assist (75% patient effort);1 person assist  -HM     Assistive Device (Sit-Stand Transfers) walker, front-wheeled  -HM     Comment, (Sit-Stand Transfer) x3 reps with FWx and 2 reps without FWx with increased unsteadiness without  -HM     Row Name 01/11/23 1517          Gait/Stairs (Locomotion)    Comment, (Gait/Stairs) ambulation deferred  -           User Key  (r) = Recorded By, (t) = Taken By, (c) = Cosigned By    Initials Name Provider Type     Gisel Weeks PT Physical Therapist               Obj/Interventions     Row Name 01/11/23 1518          Motor Skills    Therapeutic Exercise other (see comments)  BLE LAQ, seated marches, and SLR x 10 reps  -     Row Name 01/11/23 1518          Balance    Balance Assessment sitting static balance;sitting dynamic balance;sit to stand dynamic balance;standing dynamic balance;standing static balance  -     Static Sitting Balance supervision  -     Dynamic Sitting Balance supervision  -     Position, Sitting Balance unsupported;sitting edge of bed  -     Sit to Stand Dynamic Balance minimal assist  -HM     Static Standing Balance contact guard  -HM     Dynamic Standing Balance minimal assist  -HM     Position/Device Used, Standing Balance supported;walker, front-wheeled  -HM     Balance Interventions standing;weight shifting activity;dynamic;minimal challenge  -     Comment, Balance pt completed standing marches and forward/backward stepping with FWx and without AD, notable increase in unsteadiness without AD.  -           User Key  (r) = Recorded By, (t) = Taken By, (c) = Cosigned By    Initials Name Provider Type      Gisel Weeks, MATILDA Physical Therapist               Goals/Plan    No documentation.                Clinical Impression     Row Name 01/11/23 1522          Pain    Pretreatment Pain Rating 0/10 - no pain  -     Posttreatment Pain Rating 0/10 - no pain  -     Row Name 01/11/23 1522          Plan of Care Review    Plan of Care Reviewed With patient  -     Progress improving  -     Outcome Evaluation Pt completed dynamic balance activites and x3 reps STS with FWx and x2 reps STS without AD with notable increase in unsteadiness without AD. Mobility limited d/t dizziness and fatigue at this date. Pt gave good effort with BLE therex. Will continue to progress IPPT POC as tolerated.  -     Row Name 01/11/23 1522          Therapy Assessment/Plan (PT)    Rehab Potential (PT) good, to achieve stated therapy goals  -     Criteria for Skilled Interventions Met (PT) yes;skilled treatment is necessary;meets criteria  -     Therapy Frequency (PT) daily  -     Row Name 01/11/23 1522          Vital Signs    Pre Systolic BP Rehab 128  -HM     Pre Treatment Diastolic BP 84  -HM     Pretreatment Heart Rate (beats/min) 75  -HM     Pre SpO2 (%) 100  -HM     O2 Delivery Pre Treatment nasal cannula  -HM     O2 Delivery Intra Treatment nasal cannula  -HM     O2 Delivery Post Treatment nasal cannula  -HM     Pre Patient Position Supine  -     Intra Patient Position Standing  -     Post Patient Position Supine  -     Row Name 01/11/23 1522          Positioning and Restraints    Pre-Treatment Position in bed  -     Post Treatment Position bed  -HM     In Bed side rails up x2;with family/caregiver;with nsg;with other staff;encouraged to call for assist;call light within reach;notified nsg;supine  -HM           User Key  (r) = Recorded By, (t) = Taken By, (c) = Cosigned By    Initials Name Provider Type     Gisel Weeks, PT Physical Therapist               Outcome Measures     Row Name 01/11/23 1531 01/11/23 0800        How much help from another person do you currently need...    Turning from your back to your side while in flat bed without using bedrails? 3  - 4  -AN    Moving from lying on back to sitting on the side of a flat bed without bedrails? 3  - 4  -AN    Moving to and from a bed to a chair (including a wheelchair)? 3  - 2  -AN    Standing up from a chair using your arms (e.g., wheelchair, bedside chair)? 3  - 2  -AN    Climbing 3-5 steps with a railing? 2  - 2  -AN    To walk in hospital room? 3  - 2  -AN    AM-PAC 6 Clicks Score (PT) 17  - 16  -AN    Highest level of mobility 5 --> Static standing  - 5 --> Static standing  -AN    Row Name 01/11/23 1531 01/11/23 1348       Functional Assessment    Outcome Measure Options AM-PAC 6 Clicks Basic Mobility (PT)  - AM-PAC 6 Clicks Daily Activity (OT)  -          User Key  (r) = Recorded By, (t) = Taken By, (c) = Cosigned By    Initials Name Provider Type    AN Cari Russell, RN Registered Nurse     Janette Rivas, OT Occupational Therapist     Gisel Weeks, PT Physical Therapist                             Physical Therapy Education     Title: PT OT SLP Therapies (In Progress)     Topic: Physical Therapy (Done)     Point: Mobility training (Done)     Learning Progress Summary           Patient Acceptance, E,TB, VU,NR by  at 1/11/2023 1531    Acceptance, E, NR by KG at 1/10/2023 1402                   Point: Home exercise program (Done)     Learning Progress Summary           Patient Acceptance, E,TB, VU,NR by  at 1/11/2023 1531                   Point: Body mechanics (Done)     Learning Progress Summary           Patient Acceptance, E,TB, VU,NR by  at 1/11/2023 1531    Acceptance, E, NR by KG at 1/10/2023 1402                   Point: Precautions (Done)     Learning Progress Summary           Patient Acceptance, E,TB, VU,NR by  at 1/11/2023 1531    Acceptance, E, NR by KG at 1/10/2023 1402                               User  Key     Initials Effective Dates Name Provider Type Discipline    KG 05/22/20 -  Natalya Stewart PT Physical Therapist PT     09/22/22 -  Gisel Weeks PT Physical Therapist PT              PT Recommendation and Plan     Plan of Care Reviewed With: patient  Progress: improving  Outcome Evaluation: Pt completed dynamic balance activites and x3 reps STS with FWx and x2 reps STS without AD with notable increase in unsteadiness without AD. Mobility limited d/t dizziness and fatigue at this date. Pt gave good effort with BLE therex. Will continue to progress IPPT POC as tolerated.     Time Calculation:    PT Charges     Row Name 01/11/23 1532             Time Calculation    Start Time 1445  -HM      PT Received On 01/11/23  -HM         Timed Charges    70124 - PT Therapeutic Exercise Minutes 5  -HM      62594 - PT Therapeutic Activity Minutes 12  -HM         Total Minutes    Timed Charges Total Minutes 17  -HM       Total Minutes 17  -HM            User Key  (r) = Recorded By, (t) = Taken By, (c) = Cosigned By    Initials Name Provider Type     Gisel Weeks, MATILDA Physical Therapist              Therapy Charges for Today     Code Description Service Date Service Provider Modifiers Qty    [id number]  PT THERAPEUTIC ACT EA 15 MIN 1/11/2023 Gisel Weeks, PT GP 1          PT G-Codes  Outcome Measure Options: AM-PAC 6 Clicks Basic Mobility (PT)  AM-PAC 6 Clicks Score (PT): 17  AM-PAC 6 Clicks Score (OT): 16  PT Discharge Summary  Anticipated Discharge Disposition (PT): inpatient rehabilitation facility    Gisel Weeks PT  1/11/2023     no chest pain, no cough, and no shortness of breath.

## 2023-01-13 DIAGNOSIS — I48.0 PAROXYSMAL ATRIAL FIBRILLATION: ICD-10-CM

## 2023-01-20 ENCOUNTER — RX RENEWAL (OUTPATIENT)
Age: 73
End: 2023-01-20

## 2023-01-23 PROBLEM — I48.0 PAROXYSMAL ATRIAL FIBRILLATION: Chronic | Status: ACTIVE | Noted: 2023-01-04

## 2023-01-27 ENCOUNTER — NON-APPOINTMENT (OUTPATIENT)
Age: 73
End: 2023-01-27

## 2023-01-27 NOTE — DISCUSSION/SUMMARY
[FreeTextEntry1] : In summary, Ms. Amin is a 72 year old female with symptomatic atrial fibrillation. Her AF burden has progressively increased and is now 19%. She is symptomatic despite use of rate control medications. We discussed the various therapeutic options including medical therapy and catheter ablation. She is is better suited for an ablation procedure. We discussed the details of the procedure including potential complications which include but not limited to bleeding, hematoma, infection, damage to blood vessels, cardiac perforation, damage to the conduction system requiring pacemaker, MI, stroke and DVT. The patient expressed understanding and wishes to proceed. She will hold eliquis on day of the procedure.

## 2023-01-27 NOTE — HISTORY OF PRESENT ILLNESS
[FreeTextEntry1] : Ms. Amin is a pleasant 72 year old female here for follow up today. She has a history of rectal cancer (in remission), HTN, HLD and atrial fibrillation. \par \par To summarize, she was first diagnosed with atrial fibrillation incidentally during a colonoscopy. She reports to having no symptoms at the time of the diagnosis. Subsequently, no futher atrial fibrillation had been noted including with 2 weeks of outpatient monitoring. Outpatient monitoring was again performed last year with a zio patch which revealed evidence of paroxysmal atrial fibrillation with the longest episode lasting for 3 1/2 hours. TTE from 8/2021 reported normal LV function, mildly mild to moderate TR, LA size was normal. She is here for follow up today. Her AF burden is now close to 19%. She reports associated symptoms of palpitations and dizziness. Her dose of metoprolol was increased to 50 mg bid. She is on eliquis 5 mg bid for stroke prophylaxis. \par

## 2023-02-01 ENCOUNTER — NON-APPOINTMENT (OUTPATIENT)
Age: 73
End: 2023-02-01

## 2023-02-01 ENCOUNTER — APPOINTMENT (OUTPATIENT)
Dept: ELECTROPHYSIOLOGY | Facility: CLINIC | Age: 73
End: 2023-02-01
Payer: MEDICARE

## 2023-02-01 VITALS
HEIGHT: 68 IN | SYSTOLIC BLOOD PRESSURE: 110 MMHG | WEIGHT: 142 LBS | HEART RATE: 86 BPM | BODY MASS INDEX: 21.52 KG/M2 | OXYGEN SATURATION: 97 % | TEMPERATURE: 98.4 F | DIASTOLIC BLOOD PRESSURE: 64 MMHG

## 2023-02-01 PROCEDURE — 93000 ELECTROCARDIOGRAM COMPLETE: CPT

## 2023-02-01 PROCEDURE — 99212 OFFICE O/P EST SF 10 MIN: CPT

## 2023-02-01 NOTE — HISTORY OF PRESENT ILLNESS
[FreeTextEntry1] : Ms. Amin is a pleasant 72 year old female here for follow up today. She has a history of atrial fibrillation s/p recent ablation, rectal cancer (in remission), HTN, and HLD.\par \par To summarize, the patient was first diagnosed with atrial fibrillation incidentally during a colonoscopy. She reported no symptoms at the time of the initial diagnosis. Subsequently, no futher atrial fibrillation was noted including monitoring with weeks of outpatient event monitor. Outpatient monitoring was repeated last year with a zio patch which revealed evidence of paroxysmal atrial fibrillation with the longest episode lasting for 3 1/2 hours. TTE from 8/2021 reported normal LV function, mildly mild to moderate TR, LA size was normal. Further monitoring late last year reported  an increasing AF burden now close to 19%. She reported associated symptoms of palpitations and dizziness. Her dose of metoprolol was increased to 50 mg bid but she continued to experience these symptoms. She is on eliquis 5 mg bid for stroke prophylaxis. She underwent ablation for atrial fibrillation on 1/9/23 (PVI+posterior box). She is here for follow up. She reports to be doing well and denies any palpitations, chest pain, shortness of breath, dizziness or bleeding. She is eliquis 5 mg bid and metoprolol ER 50 mg bid. She is tolerated her medications well.

## 2023-02-01 NOTE — DISCUSSION/SUMMARY
[FreeTextEntry1] : Ms. Amin is a pleasant 72 year old female with a history of high burden symptomatic paroxysmal atrial fibrillation. She recently underwent ablation for atrial fibrillation (PVI+post box). She is doing well post-procedure and denies any recurrent symptoms of atrial fibrillation. She will continue eliquis 5 mg bid. I will decrease the dose of metoprolol ER to 50 mg daily. She will return for follow up in 3 months at which time she will wear a 1 week event monitor. \par

## 2023-04-06 ENCOUNTER — RX RENEWAL (OUTPATIENT)
Age: 73
End: 2023-04-06

## 2023-05-02 NOTE — HISTORY OF PRESENT ILLNESS
[FreeTextEntry1] : Ms. Amin is a pleasant 72 year old female here for follow up today. She has a history of atrial fibrillation s/p recent ablation, rectal cancer (in remission), HTN, and HLD.\par \par To summarize, the patient was first diagnosed with atrial fibrillation incidentally during a colonoscopy. She reported no symptoms at the time of the initial diagnosis. Subsequently, no further atrial fibrillation was noted including monitoring with weeks of outpatient event monitor. Outpatient monitoring was repeated last year with a zio patch which revealed evidence of paroxysmal atrial fibrillation with the longest episode lasting for 3 1/2 hours. TTE from 8/2021 reported normal LV function, mildly mild to moderate TR, LA size was normal. Further monitoring late last year reported an increasing AF burden now close to 19%. She reported associated symptoms of palpitations and dizziness. Her dose of metoprolol was increased to 50 mg bid but she continued to experience these symptoms. She is on eliquis 5 mg bid for stroke prophylaxis. \par \par She subsequently underwent ablation for atrial fibrillation on (PVI+posterior box) on 1/9/23. During post ablation f/u metoprolol reduced to 50mg daily. \par \par INCOMPLETE NOTE NOT YET SEEN

## 2023-05-03 ENCOUNTER — APPOINTMENT (OUTPATIENT)
Dept: ELECTROPHYSIOLOGY | Facility: CLINIC | Age: 73
End: 2023-05-03

## 2023-05-09 ENCOUNTER — NON-APPOINTMENT (OUTPATIENT)
Age: 73
End: 2023-05-09

## 2023-06-07 ENCOUNTER — NON-APPOINTMENT (OUTPATIENT)
Age: 73
End: 2023-06-07

## 2023-06-07 ENCOUNTER — APPOINTMENT (OUTPATIENT)
Dept: ELECTROPHYSIOLOGY | Facility: CLINIC | Age: 73
End: 2023-06-07
Payer: MEDICARE

## 2023-06-07 VITALS
OXYGEN SATURATION: 95 % | HEIGHT: 68 IN | DIASTOLIC BLOOD PRESSURE: 70 MMHG | SYSTOLIC BLOOD PRESSURE: 126 MMHG | WEIGHT: 140 LBS | BODY MASS INDEX: 21.22 KG/M2 | HEART RATE: 86 BPM

## 2023-06-07 PROCEDURE — 99214 OFFICE O/P EST MOD 30 MIN: CPT

## 2023-06-07 PROCEDURE — 93000 ELECTROCARDIOGRAM COMPLETE: CPT

## 2023-06-07 NOTE — DISCUSSION/SUMMARY
[FreeTextEntry1] : Ms. Amin is a pleasant 72 year old female here for follow up today. She has a history of atrial fibrillation s/p  ablation, rectal cancer (in remission), HTN, and HLD.\par \par To summarize, the patient was first diagnosed with atrial fibrillation incidentally during a colonoscopy. She reported no symptoms at the time of the initial diagnosis. Subsequently, no futher atrial fibrillation was noted including monitoring with weeks of outpatient event monitor. Outpatient monitoring was repeated last year with a zio patch which revealed evidence of paroxysmal atrial fibrillation with the longest episode lasting for 3 1/2 hours. TTE from 8/2021 reported normal LV function, mildly mild to moderate TR, LA size was normal. Further monitoring late last year reported an increasing AF burden now close to 19%. She reported associated symptoms of palpitations and dizziness. Her dose of metoprolol was increased to 50 mg bid but she continued to experience these symptoms. She is on eliquis 5 mg bid for stroke prophylaxis. She underwent ablation for atrial fibrillation on 1/9/23 (PVI+posterior box). \par \par She had been doing well during post ablation f/u and metoprolol was reduced to 50mg daily 2/1/23.\par \par Today, reports she has been feeling well since last follow up. She denies any symptomatic arrhythmia recurrence. Palpitations and dizziness have resolved. Tolerating eliquis without c/o easy bruising, bleeding, or falls. ECG reveals SR. Has not yet worn external monitor post procedure. \par \par Recommendation: \par \par Ms. Mueller has been doing very well s/p ablation for symptomatic PAF reporting resolution of symptoms in office today. Will arrange 1 week holter. [id number], to continue Eliquis indefinitely as long as tolerated. Will consider further reduction in metoprolol dosage after monitoring complete. EP follow up in 6 months or sooner PRN.\par \par Mercedes Hollis ANP-C [EKG obtained to assist in diagnosis and management of assessed problem(s)] : EKG obtained to assist in diagnosis and management of assessed problem(s)

## 2023-06-07 NOTE — HISTORY OF PRESENT ILLNESS
[FreeTextEntry1] : Ms. Amin is a pleasant 72 year old female here for follow up today. She has a history of atrial fibrillation s/p  ablation, rectal cancer (in remission), HTN, and HLD.\par \par To summarize, the patient was first diagnosed with atrial fibrillation incidentally during a colonoscopy. She reported no symptoms at the time of the initial diagnosis. Subsequently, no futher atrial fibrillation was noted including monitoring with weeks of outpatient event monitor. Outpatient monitoring was repeated last year with a zio patch which revealed evidence of paroxysmal atrial fibrillation with the longest episode lasting for 3 1/2 hours. TTE from 8/2021 reported normal LV function, mildly mild to moderate TR, LA size was normal. Further monitoring late last year reported an increasing AF burden now close to 19%. She reported associated symptoms of palpitations and dizziness. Her dose of metoprolol was increased to 50 mg bid but she continued to experience these symptoms. She is on eliquis 5 mg bid for stroke prophylaxis. She underwent ablation for atrial fibrillation on 1/9/23 (PVI+posterior box). \par \par She had been doing well during post ablation f/u and metoprolol was reduced to 50mg daily 2/1/23.\par \par Today, reports she has been feeling well since last follow up. She denies any symptomatic arrhythmia recurrence. Palpitations and dizziness have resolved. Tolerating eliquis without c/o easy bruising, bleeding, or falls. ECG reveals SR. Has not yet worn external monitor post procedure.

## 2023-09-13 ENCOUNTER — APPOINTMENT (OUTPATIENT)
Dept: COLORECTAL SURGERY | Facility: CLINIC | Age: 73
End: 2023-09-13
Payer: MEDICARE

## 2023-09-13 VITALS
BODY MASS INDEX: 21.03 KG/M2 | TEMPERATURE: 98.7 F | RESPIRATION RATE: 16 BRPM | HEIGHT: 69 IN | SYSTOLIC BLOOD PRESSURE: 125 MMHG | HEART RATE: 80 BPM | OXYGEN SATURATION: 95 % | WEIGHT: 142 LBS | DIASTOLIC BLOOD PRESSURE: 80 MMHG

## 2023-09-13 DIAGNOSIS — Z85.048 PERSONAL HISTORY OF OTHER MALIGNANT NEOPLASM OF RECTUM, RECTOSIGMOID JUNCTION, AND ANUS: ICD-10-CM

## 2023-09-13 PROCEDURE — 99214 OFFICE O/P EST MOD 30 MIN: CPT | Mod: 25

## 2023-09-13 PROCEDURE — 46600 DIAGNOSTIC ANOSCOPY SPX: CPT

## 2023-10-02 ENCOUNTER — RX RENEWAL (OUTPATIENT)
Age: 73
End: 2023-10-02

## 2023-11-03 ENCOUNTER — RX RENEWAL (OUTPATIENT)
Age: 73
End: 2023-11-03

## 2023-11-14 NOTE — ASU PATIENT PROFILE, ADULT - AS SC BRADEN MOISTURE
Have Your Skin Lesions Been Treated?: not been treated Is This A New Presentation, Or A Follow-Up?: Growth How Severe Is Your Skin Lesion?: mild (4) rarely moist

## 2023-12-05 ENCOUNTER — RX RENEWAL (OUTPATIENT)
Age: 73
End: 2023-12-05

## 2023-12-13 ENCOUNTER — APPOINTMENT (OUTPATIENT)
Dept: ELECTROPHYSIOLOGY | Facility: CLINIC | Age: 73
End: 2023-12-13

## 2024-03-22 ENCOUNTER — RX RENEWAL (OUTPATIENT)
Age: 74
End: 2024-03-22

## 2024-05-30 NOTE — ASU PATIENT PROFILE, ADULT - ABILITY TO HEAR (WITH HEARING AID OR HEARING APPLIANCE IF NORMALLY USED):
wears hearing aids "sometimes"/Mildly to Moderately Impaired: difficulty hearing in some environments or speaker may need to increase volume or speak distinctly
No

## 2024-05-31 ENCOUNTER — APPOINTMENT (OUTPATIENT)
Dept: FAMILY MEDICINE | Facility: CLINIC | Age: 74
End: 2024-05-31
Payer: MEDICARE

## 2024-05-31 VITALS
HEIGHT: 69 IN | OXYGEN SATURATION: 98 % | RESPIRATION RATE: 14 BRPM | SYSTOLIC BLOOD PRESSURE: 122 MMHG | DIASTOLIC BLOOD PRESSURE: 78 MMHG | BODY MASS INDEX: 21.77 KG/M2 | WEIGHT: 147 LBS | HEART RATE: 82 BPM

## 2024-05-31 DIAGNOSIS — E78.5 HYPERLIPIDEMIA, UNSPECIFIED: ICD-10-CM

## 2024-05-31 DIAGNOSIS — I10 ESSENTIAL (PRIMARY) HYPERTENSION: ICD-10-CM

## 2024-05-31 DIAGNOSIS — C44.92 SQUAMOUS CELL CARCINOMA OF SKIN, UNSPECIFIED: ICD-10-CM

## 2024-05-31 DIAGNOSIS — D53.9 NUTRITIONAL ANEMIA, UNSPECIFIED: ICD-10-CM

## 2024-05-31 DIAGNOSIS — I48.0 PAROXYSMAL ATRIAL FIBRILLATION: ICD-10-CM

## 2024-05-31 DIAGNOSIS — Z12.11 ENCOUNTER FOR SCREENING FOR MALIGNANT NEOPLASM OF COLON: ICD-10-CM

## 2024-05-31 DIAGNOSIS — E78.00 PURE HYPERCHOLESTEROLEMIA, UNSPECIFIED: ICD-10-CM

## 2024-05-31 PROCEDURE — 99214 OFFICE O/P EST MOD 30 MIN: CPT

## 2024-05-31 PROCEDURE — G2211 COMPLEX E/M VISIT ADD ON: CPT

## 2024-05-31 RX ORDER — METOPROLOL SUCCINATE 50 MG/1
50 TABLET, EXTENDED RELEASE ORAL
Qty: 180 | Refills: 3 | Status: ACTIVE | COMMUNITY
Start: 2017-03-06 | End: 1900-01-01

## 2024-05-31 RX ORDER — APIXABAN 5 MG/1
5 TABLET, FILM COATED ORAL
Qty: 180 | Refills: 2 | Status: COMPLETED | COMMUNITY
Start: 2021-08-25 | End: 2024-05-31

## 2024-05-31 NOTE — HEALTH RISK ASSESSMENT
[No] : In the past 12 months have you used drugs other than those required for medical reasons? No [No falls in past year] : Patient reported no falls in the past year [0] : 2) Feeling down, depressed, or hopeless: Not at all (0) [PHQ-2 Negative - No further assessment needed] : PHQ-2 Negative - No further assessment needed [With Patient/Caregiver] : , with patient/caregiver [Designated Healthcare Proxy] : Designated healthcare proxy [Name: ___] : Health Care Proxy's Name: [unfilled]  [Relationship: ___] : Relationship: [unfilled] [Never] : Never [Patient reported mammogram was normal] : Patient reported mammogram was normal [Patient declined PAP Smear] : Patient declined PAP Smear [Patient reported bone density results were normal] : Patient reported bone density results were normal [Patient reported colonoscopy was normal] : Patient reported colonoscopy was normal [HIV test declined] : HIV test declined [Hepatitis C test declined] : Hepatitis C test declined [None] : None [With Significant Other] : lives with significant other [Retired] : retired [] :  [# Of Children ___] : has [unfilled] children [Sexually Active] : sexually active [Feels Safe at Home] : Feels safe at home [Fully functional (bathing, dressing, toileting, transferring, walking, feeding)] : Fully functional (bathing, dressing, toileting, transferring, walking, feeding) [Fully functional (using the telephone, shopping, preparing meals, housekeeping, doing laundry, using] : Fully functional and needs no help or supervision to perform IADLs (using the telephone, shopping, preparing meals, housekeeping, doing laundry, using transportation, managing medications and managing finances) [Smoke Detector] : smoke detector [Safety elements used in home] : safety elements used in home [Seat Belt] :  uses seat belt [de-identified] : Oncology, Portology, Cardiology [de-identified] : walks [de-identified] : variety, oatmeal, yogurt... [[id number]] : 0 [AdvancecareDate] : 09/22 [Reports changes in hearing] : Reports no changes in hearing [Reports changes in vision] : Reports no changes in vision [Reports changes in dental health] : Reports no changes in dental health [TB Exposure] : is not being exposed to tuberculosis [MammogramDate] : 2018 [BoneDensityDate] : 2018 [ColonoscopyDate] : 2018

## 2024-05-31 NOTE — HISTORY OF PRESENT ILLNESS
[FreeTextEntry1] : Medication refills [de-identified] : 74 y/o female presenting for medication refills, no acute complains. Has PMHx significant for A-Fib on Eliquis, HTN, HLD, Macrocytic anemia, SCC, Anal cancer, PMD Dr Junior.

## 2024-05-31 NOTE — ASSESSMENT
[FreeTextEntry1] : 72 y/o female PMHx significant for A-Fib on Eliquis, HTN, HLD, Macrocytic anemia, SCC, Anal cancer, presenting for medication refills, no acute complains.  CVS: PAF, HTN, HLD -Pt states that has been off Eliquis -Refilled Metoprolol ER 50 mg bid, Rosuvastatin 10 mg daily -Exercise as tolerated  HEME: Macrocytic anemia -Check CBC  HEMEONC: SCC, Anal cancer -Followed by [facility] -Check CEA.  HCM -Blood work as outpt -Mammogram referral  -FIOBT card provided

## 2024-05-31 NOTE — PAST MEDICAL HISTORY
[Surgical Menopause] : in surgical menopause [Total Preg ___] : G[unfilled] [Abortions ___] : Abortions:[unfilled] [de-identified] : 1997

## 2024-06-12 NOTE — ASU PATIENT PROFILE, ADULT - MENTAL HEALTH CONDITIONS/SYMPTOMS, PROFILE
Medication changes made today:  None      Tests Ordered:  None      Test Results:  Nothing Pending      Understanding your instructions:  If you feel that things may have been explained in a way that you do not understand or did not receive easy to understand instruction, please contact me at [phone] and I would be more than happy to review your plan of care with you. Your healthcare is important to us and we want to make sure you understand your directions.    Our Electrophysiology Team will continue to collaborate and work very closely together to best meet your needs.    Thank you for choosing us to take care of your electrophysiology needs. It is a pleasure to work with you, and again, please contact us for any questions or concerns anytime.      none

## 2024-09-16 ENCOUNTER — RX RENEWAL (OUTPATIENT)
Age: 74
End: 2024-09-16

## 2025-01-20 ENCOUNTER — APPOINTMENT (OUTPATIENT)
Dept: FAMILY MEDICINE | Facility: CLINIC | Age: 75
End: 2025-01-20

## 2025-02-14 ENCOUNTER — APPOINTMENT (OUTPATIENT)
Dept: FAMILY MEDICINE | Facility: CLINIC | Age: 75
End: 2025-02-14
Payer: MEDICARE

## 2025-02-14 VITALS
SYSTOLIC BLOOD PRESSURE: 130 MMHG | TEMPERATURE: 97 F | DIASTOLIC BLOOD PRESSURE: 62 MMHG | HEART RATE: 82 BPM | BODY MASS INDEX: 17.77 KG/M2 | WEIGHT: 120 LBS | OXYGEN SATURATION: 99 % | RESPIRATION RATE: 14 BRPM | HEIGHT: 69 IN

## 2025-02-14 DIAGNOSIS — I48.0 PAROXYSMAL ATRIAL FIBRILLATION: ICD-10-CM

## 2025-02-14 DIAGNOSIS — D53.9 NUTRITIONAL ANEMIA, UNSPECIFIED: ICD-10-CM

## 2025-02-14 DIAGNOSIS — R63.4 ABNORMAL WEIGHT LOSS: ICD-10-CM

## 2025-02-14 DIAGNOSIS — E78.5 HYPERLIPIDEMIA, UNSPECIFIED: ICD-10-CM

## 2025-02-14 DIAGNOSIS — G30.9 ALZHEIMER'S DISEASE, UNSPECIFIED: ICD-10-CM

## 2025-02-14 DIAGNOSIS — G25.9 EXTRAPYRAMIDAL AND MOVEMENT DISORDER, UNSPECIFIED: ICD-10-CM

## 2025-02-14 DIAGNOSIS — G24.9 DYSTONIA, UNSPECIFIED: ICD-10-CM

## 2025-02-14 DIAGNOSIS — F02.80 ALZHEIMER'S DISEASE, UNSPECIFIED: ICD-10-CM

## 2025-02-14 PROCEDURE — G2211 COMPLEX E/M VISIT ADD ON: CPT

## 2025-02-14 PROCEDURE — 36415 COLL VENOUS BLD VENIPUNCTURE: CPT

## 2025-02-14 PROCEDURE — 99214 OFFICE O/P EST MOD 30 MIN: CPT

## 2025-02-17 ENCOUNTER — NON-APPOINTMENT (OUTPATIENT)
Age: 75
End: 2025-02-17

## 2025-02-17 LAB
ALBUMIN SERPL ELPH-MCNC: 4.4 G/DL
ALP BLD-CCNC: 82 U/L
ALT SERPL-CCNC: 19 U/L
ANION GAP SERPL CALC-SCNC: 12 MMOL/L
AST SERPL-CCNC: 18 U/L
BILIRUB SERPL-MCNC: 0.4 MG/DL
BUN SERPL-MCNC: 22 MG/DL
CALCIUM SERPL-MCNC: 9.7 MG/DL
CEA SERPL-MCNC: 3 NG/ML
CHLORIDE SERPL-SCNC: 103 MMOL/L
CHOLEST SERPL-MCNC: 169 MG/DL
CO2 SERPL-SCNC: 25 MMOL/L
CREAT SERPL-MCNC: 1 MG/DL
EGFR: 58 ML/MIN/1.73M2
GLUCOSE SERPL-MCNC: 94 MG/DL
HCT VFR BLD CALC: 39.5 %
HDLC SERPL-MCNC: 79 MG/DL
HGB BLD-MCNC: 12.7 G/DL
LDLC SERPL CALC-MCNC: 77 MG/DL
MCHC RBC-ENTMCNC: 32.1 PG
MCHC RBC-ENTMCNC: 32.2 G/DL
MCV RBC AUTO: 99.7 FL
NONHDLC SERPL-MCNC: 90 MG/DL
PLATELET # BLD AUTO: 217 K/UL
POTASSIUM SERPL-SCNC: 4.9 MMOL/L
PROT SERPL-MCNC: 6.5 G/DL
RBC # BLD: 3.96 M/UL
RBC # FLD: 12.5 %
SODIUM SERPL-SCNC: 141 MMOL/L
TRIGL SERPL-MCNC: 67 MG/DL
TSH SERPL-ACNC: 3.61 UIU/ML
WBC # FLD AUTO: 3.81 K/UL

## 2025-03-12 ENCOUNTER — RX RENEWAL (OUTPATIENT)
Age: 75
End: 2025-03-12

## 2025-06-12 ENCOUNTER — RX RENEWAL (OUTPATIENT)
Age: 75
End: 2025-06-12

## 2025-08-13 ENCOUNTER — APPOINTMENT (OUTPATIENT)
Dept: COLORECTAL SURGERY | Facility: CLINIC | Age: 75
End: 2025-08-13
Payer: MEDICARE

## 2025-08-13 VITALS
TEMPERATURE: 97.2 F | HEART RATE: 79 BPM | BODY MASS INDEX: 17.77 KG/M2 | SYSTOLIC BLOOD PRESSURE: 127 MMHG | HEIGHT: 69 IN | RESPIRATION RATE: 14 BRPM | DIASTOLIC BLOOD PRESSURE: 75 MMHG | WEIGHT: 120 LBS

## 2025-08-13 DIAGNOSIS — Z85.048 PERSONAL HISTORY OF OTHER MALIGNANT NEOPLASM OF RECTUM, RECTOSIGMOID JUNCTION, AND ANUS: ICD-10-CM

## 2025-08-13 PROCEDURE — 46600 DIAGNOSTIC ANOSCOPY SPX: CPT

## 2025-08-13 PROCEDURE — 99215 OFFICE O/P EST HI 40 MIN: CPT | Mod: 25
